# Patient Record
Sex: MALE | Race: OTHER | HISPANIC OR LATINO | Employment: UNEMPLOYED | URBAN - METROPOLITAN AREA
[De-identification: names, ages, dates, MRNs, and addresses within clinical notes are randomized per-mention and may not be internally consistent; named-entity substitution may affect disease eponyms.]

---

## 2017-05-29 ENCOUNTER — HOSPITAL ENCOUNTER (EMERGENCY)
Facility: HOSPITAL | Age: 27
Discharge: HOME | End: 2017-05-29
Admitting: EMERGENCY MEDICINE

## 2017-05-29 ENCOUNTER — APPOINTMENT (EMERGENCY)
Dept: RADIOLOGY | Facility: HOSPITAL | Age: 27
End: 2017-05-29

## 2017-05-29 VITALS
OXYGEN SATURATION: 98 % | RESPIRATION RATE: 16 BRPM | SYSTOLIC BLOOD PRESSURE: 120 MMHG | TEMPERATURE: 98.3 F | DIASTOLIC BLOOD PRESSURE: 76 MMHG | HEART RATE: 79 BPM

## 2017-05-29 DIAGNOSIS — S60.221A CONTUSION OF RIGHT HAND: ICD-10-CM

## 2017-05-29 DIAGNOSIS — S63.91XA: Primary | ICD-10-CM

## 2017-05-29 PROCEDURE — 90471 IMMUNIZATION ADMIN: CPT

## 2017-05-29 PROCEDURE — 99283 EMERGENCY DEPT VISIT LOW MDM: CPT

## 2017-05-29 PROCEDURE — 90715 TDAP VACCINE 7 YRS/> IM: CPT | Performed by: PHYSICIAN ASSISTANT

## 2017-05-29 PROCEDURE — 73130 X-RAY EXAM OF HAND: CPT

## 2017-05-29 RX ORDER — IBUPROFEN 600 MG/1
600 TABLET ORAL ONCE
Status: COMPLETED | OUTPATIENT
Start: 2017-05-29 | End: 2017-05-29

## 2017-05-29 RX ORDER — IBUPROFEN 600 MG/1
600 TABLET ORAL EVERY 6 HOURS PRN
Qty: 30 TABLET | Refills: 0 | Status: SHIPPED | OUTPATIENT
Start: 2017-05-29 | End: 2019-08-12

## 2017-05-29 RX ORDER — BACITRACIN, NEOMYCIN, POLYMYXIN B 400; 3.5; 5 [USP'U]/G; MG/G; [USP'U]/G
1 OINTMENT TOPICAL ONCE
Status: COMPLETED | OUTPATIENT
Start: 2017-05-29 | End: 2017-05-29

## 2017-05-29 RX ORDER — BACITRACIN, NEOMYCIN, POLYMYXIN B 400; 3.5; 5 [USP'U]/G; MG/G; [USP'U]/G
OINTMENT TOPICAL
Status: COMPLETED
Start: 2017-05-29 | End: 2017-05-29

## 2017-05-29 RX ADMIN — TETANUS TOXOID, REDUCED DIPHTHERIA TOXOID AND ACELLULAR PERTUSSIS VACCINE, ADSORBED 0.5 ML: 5; 2.5; 8; 8; 2.5 SUSPENSION INTRAMUSCULAR at 17:57

## 2017-05-29 RX ADMIN — IBUPROFEN 600 MG: 600 TABLET, FILM COATED ORAL at 18:00

## 2017-05-29 RX ADMIN — BACITRACIN, NEOMYCIN, POLYMYXIN B 1 LARGE APPLICATION: 400; 3.5; 5 OINTMENT TOPICAL at 18:00

## 2017-12-14 ENCOUNTER — HOSPITAL ENCOUNTER (EMERGENCY)
Facility: HOSPITAL | Age: 27
Discharge: HOME | End: 2017-12-14

## 2017-12-14 VITALS
RESPIRATION RATE: 16 BRPM | SYSTOLIC BLOOD PRESSURE: 129 MMHG | WEIGHT: 170 LBS | HEART RATE: 86 BPM | DIASTOLIC BLOOD PRESSURE: 66 MMHG | OXYGEN SATURATION: 97 % | TEMPERATURE: 98.6 F

## 2017-12-14 DIAGNOSIS — R30.0 DYSURIA: Primary | ICD-10-CM

## 2017-12-14 DIAGNOSIS — Z11.3 SCREENING EXAMINATION FOR STD (SEXUALLY TRANSMITTED DISEASE): ICD-10-CM

## 2017-12-14 LAB
BILIRUB UR QL STRIP: NEGATIVE
CLARITY UR: NORMAL
COLOR UR: YELLOW
COLOR, POC: NORMAL
GLUCOSE UR STRIP-MCNC: NEGATIVE MG/DL
HGB UR QL STRIP.AUTO: NEGATIVE
KETONES UR STRIP-MCNC: NEGATIVE MG/DL
LEUKOCYTE ESTERASE UR QL STRIP: NEGATIVE
NITRITE UR QL STRIP: NEGATIVE
PH UR STRIP.AUTO: 7 [PH] (ref 4.5–8)
PROT UR STRIP-MCNC: NEGATIVE MG/DL
SP GR UR STRIP.AUTO: 1.02 (ref 1–1.03)
UROBILINOGEN UR QL STRIP.AUTO: 0.2 E.U./DL

## 2017-12-14 PROCEDURE — 87491 CHLMYD TRACH DNA AMP PROBE: CPT | Performed by: PHYSICIAN ASSISTANT

## 2017-12-14 PROCEDURE — 81002 URINALYSIS NONAUTO W/O SCOPE: CPT

## 2017-12-14 PROCEDURE — 87591 N.GONORRHOEAE DNA AMP PROB: CPT | Performed by: PHYSICIAN ASSISTANT

## 2017-12-14 PROCEDURE — 81003 URINALYSIS AUTO W/O SCOPE: CPT

## 2017-12-14 PROCEDURE — 99283 EMERGENCY DEPT VISIT LOW MDM: CPT

## 2017-12-14 PROCEDURE — 96372 THER/PROPH/DIAG INJ SC/IM: CPT

## 2017-12-14 RX ORDER — AZITHROMYCIN 250 MG/1
1000 TABLET, FILM COATED ORAL ONCE
Status: COMPLETED | OUTPATIENT
Start: 2017-12-14 | End: 2017-12-14

## 2017-12-14 RX ADMIN — AZITHROMYCIN 1000 MG: 250 TABLET, FILM COATED ORAL at 19:02

## 2017-12-14 RX ADMIN — LIDOCAINE HYDROCHLORIDE 250 MG: 10 INJECTION, SOLUTION EPIDURAL; INFILTRATION; INTRACAUDAL; PERINEURAL at 19:02

## 2017-12-15 NOTE — DISCHARGE INSTRUCTIONS
Dysuria   WHAT YOU NEED TO KNOW:   Dysuria is difficulty urinating, or pain, burning, or discomfort with urination  Dysuria is usually a symptom of another problem  DISCHARGE INSTRUCTIONS:   Return to the emergency department if:   · You have severe back, side, or abdominal pain  · You have fever and shaking chills  · You vomit several times in a row  Contact your healthcare provider if:   · Your symptoms do not go away, even after treatment  · You have questions or concerns about your condition or care  Medicines:   · Medicines  may be given to help treat a bacterial infection or help decrease bladder spasms  · Take your medicine as directed  Contact your healthcare provider if you think your medicine is not helping or if you have side effects  Tell him of her if you are allergic to any medicine  Keep a list of the medicines, vitamins, and herbs you take  Include the amounts, and when and why you take them  Bring the list or the pill bottles to follow-up visits  Carry your medicine list with you in case of an emergency  Follow up with your healthcare provider as directed: Your healthcare provider may also refer you to a urologist or nephrologist to have additional testing  Write down your questions so you remember to ask them during your visits  Manage your dysuria:   · Drink more liquids  Liquids help flush out bacteria that may be causing an infection  Ask your healthcare provider how much liquid to drink each day and which liquids are best for you  · Take sitz baths as directed  Fill a bathtub with 4 to 6 inches of warm water  You may also use a sitz bath pan that fits over a toilet  Sit in the sitz bath for 20 minutes  Do this 2 to 3 times a day, or as directed  The warm water can help decrease pain and swelling  © 2017 [employer] Information is for End User's use only and may not be sold, redistributed or otherwise used for commercial purposes   All illustrations and images included in CareNotes® are the copyrighted property of A D A M , Inc  or Edwar Carpenter  The above information is an  only  It is not intended as medical advice for individual conditions or treatments  Talk to your doctor, nurse or pharmacist before following any medical regimen to see if it is safe and effective for you  Sexually Transmitted Diseases   WHAT YOU NEED TO KNOW:   A sexually transmitted disease (STD), is also called a sexually transmitted infection (STI)  An STD is an infection caused by bacteria or a virus  STDs are spread by oral, genital, or anal sex  Some examples of STDs are HIV, chlamydia, syphilis, and gonorrhea  DISCHARGE INSTRUCTIONS:   Return to the emergency department if:   · You have genital swelling or pain, or unusual bleeding  · You have joint pain, rash, swollen lymph nodes, or night sweats  · You have severe abdominal pain  Contact your healthcare provider if:   · You have a fever  · Your symptoms do not go away or they get worse, even after treatment  · You have bleeding or pain during sex  · You have questions or concerns about your condition or care  Medicines:   · Medicines  help treat the infection  · Take your medicine as directed  Contact your healthcare provider if you think your medicine is not helping or if you have side effects  Tell him of her if you are allergic to any medicine  Keep a list of the medicines, vitamins, and herbs you take  Include the amounts, and when and why you take them  Bring the list or the pill bottles to follow-up visits  Carry your medicine list with you in case of an emergency  Prevent the spread of an STD:  Ask your healthcare provider for more information about the following safe sex practices:  · Use condoms  Use a latex condom if you have oral, genital, or anal sex  Use a new condom each time  Use a polyurethane condom if you are allergic to latex  · Do not douche    Douching upsets the normal balance of bacteria are found in your vagina  It does not prevent or clear up vaginal infections  · Do not have sex with someone who has an STD  This includes oral and anal sex  · Limit sexual partners  Have sex with one person who is not having sex with anyone else  · Do not have sex during treatment  Do not have sex while you or your partners are being treated for an STD  · Get screening tests regularly  if you are sexually active  · Get vaccinated  Vaccines may help to prevent your risk of some STDs  Ask your healthcare provider for more information about vaccines for STDs  Follow up with your healthcare provider as directed:  Write down your questions so you remember to ask them during your visits  © 2017 [facility] Information is for End User's use only and may not be sold, redistributed or otherwise used for commercial purposes  All illustrations and images included in CareNotes® are the copyrighted property of A D A M , Inc  or Edwar Carpenter  The above information is an  only  It is not intended as medical advice for individual conditions or treatments  Talk to your doctor, nurse or pharmacist before following any medical regimen to see if it is safe and effective for you  DISCHARGE INSTRUCTIONS:    FOLLOW UP WITH YOUR PRIMARY CARE PROVIDER OR THE [address] [place] [address]  MAKE AN APPOINTMENT TO BE SEEN     YOU WERE TESTED FOR GONORRHEA AND CHLAMYDIA TODAY IN THE ER  IF POSITIVE, WE WILL CALL YOU IN 2-3 DAYS  HAVE YOUR PARTNER GET TESTED AND TREATED  NO SEXUAL INTERCOURSE FOR 7 DAYS  REST AND DRINK PLENTY OF FLUIDS  IF SYMPTOMS WORSEN OR NEW SYMPTOMS ARISE, RETURN TO THE ER TO BE SEEN

## 2017-12-15 NOTE — ED PROVIDER NOTES
History  Chief Complaint   Patient presents with    Possible UTI     Burning, irritation with urination, pink discharge after urination  Denies fever  27y  o male with PMH of ADHD and asthma presents to the ER for dysuria for 1 week and hematuria for 2 days  Patient denies taking any medication for his symptoms  He describes his dysuria as burning and symptoms are constant  Patient was last sexually active 3 months ago with one partner and they did not use protection  He denies fever, chills, chest pain, dyspnea, N/V/D, abdominal pain, weakness, paresthesias, urgency, frequency, scrotal/penile swelling or pain or discharge  History provided by:  Patient   used: No        Prior to Admission Medications   Prescriptions Last Dose Informant Patient Reported? Taking?   ibuprofen (MOTRIN) 600 mg tablet   No No   Sig: Take 1 tablet by mouth every 6 (six) hours as needed for mild pain for up to 10 days      Facility-Administered Medications: None       Past Medical History:   Diagnosis Date    ADHD (attention deficit hyperactivity disorder)     Asthma        Past Surgical History:   Procedure Laterality Date    NO PAST SURGERIES         History reviewed  No pertinent family history  I have reviewed and agree with the history as documented  Social History   Substance Use Topics    Smoking status: Current Every Day Smoker     Packs/day: 0 50    Smokeless tobacco: Never Used    Alcohol use Yes      Comment: occasional         Review of Systems   Constitutional: Negative for chills and fever  Respiratory: Negative for shortness of breath  Cardiovascular: Negative for chest pain  Gastrointestinal: Negative for abdominal pain, diarrhea, nausea and vomiting  Genitourinary: Positive for dysuria and hematuria  Negative for discharge, frequency, genital sores, penile pain, penile swelling, scrotal swelling, testicular pain and urgency     Musculoskeletal: Negative for neck stiffness  Skin: Negative for rash  Allergic/Immunologic: Negative for food allergies  Neurological: Negative for weakness and numbness  Physical Exam  ED Triage Vitals [12/14/17 1749]   Temperature Pulse Respirations Blood Pressure SpO2   98 6 °F (37 °C) 86 16 129/66 97 %      Temp Source Heart Rate Source Patient Position - Orthostatic VS BP Location FiO2 (%)   Temporal Monitor Sitting Right arm --      Pain Score       1           Orthostatic Vital Signs  Vitals:    12/14/17 1749   BP: 129/66   Pulse: 86   Patient Position - Orthostatic VS: Sitting       Physical Exam   Constitutional:  Non-toxic appearance  No distress  HENT:   Head: Normocephalic and atraumatic  Right Ear: Tympanic membrane, external ear and ear canal normal  No drainage, swelling or tenderness  No foreign bodies  Tympanic membrane is not erythematous  No hemotympanum  Left Ear: Tympanic membrane, external ear and ear canal normal  No drainage, swelling or tenderness  No foreign bodies  Tympanic membrane is not erythematous  No hemotympanum  Nose: Nose normal    Mouth/Throat: Uvula is midline, oropharynx is clear and moist and mucous membranes are normal  No uvula swelling  No posterior oropharyngeal edema, posterior oropharyngeal erythema or tonsillar abscesses  No tonsillar exudate  Neck: Normal range of motion and phonation normal  Neck supple  No tracheal deviation present  Cardiovascular: Normal rate, regular rhythm, S1 normal, S2 normal and normal heart sounds  Exam reveals no gallop and no friction rub  No murmur heard  Pulmonary/Chest: Effort normal and breath sounds normal  No respiratory distress  He has no decreased breath sounds  He has no wheezes  He has no rhonchi  He has no rales  He exhibits no tenderness  Abdominal: Soft  Bowel sounds are normal  He exhibits no distension  There is no tenderness  There is no rebound, no guarding and no CVA tenderness     Genitourinary: Penis normal  No phimosis, paraphimosis, hypospadias, penile erythema or penile tenderness  No discharge found  Genitourinary Comments: RN present for exam    Neurological: He is alert  GCS eye subscore is 4  GCS verbal subscore is 5  GCS motor subscore is 6  Skin: Skin is warm and dry  No rash noted  Psychiatric: He has a normal mood and affect  Nursing note and vitals reviewed  ED Medications  Medications   azithromycin (ZITHROMAX) tablet 1,000 mg (1,000 mg Oral Given 12/14/17 1902)   cefTRIAXone (ROCEPHIN) 250 mg in lidocaine (PF) (XYLOCAINE-MPF) 1 % IM only syringe (250 mg Intramuscular Given 12/14/17 1902)       Diagnostic Studies  Results Reviewed     Procedure Component Value Units Date/Time    Chlamydia/GC amplified DNA by PCR [[id number]] Collected:  12/14/17 1834    Lab Status:   In process Specimen:  Urine from Urine, Other Updated:  12/14/17 1839    POCT urinalysis dipstick [[id number]]  (Normal) Resulted:  12/14/17 1834    Lab Status:  Final result Updated:  12/14/17 1834     Color, UA -    ED Urine Macroscopic [[id number]]  (Normal) Collected:  12/14/17 1847    Lab Status:  Final result Specimen:  Urine Updated:  12/14/17 1830     Color, UA Yellow     Clarity, UA Cloudy     pH, UA 7 0     Leukocytes, UA Negative     Nitrite, UA Negative     Protein, UA Negative mg/dl      Glucose, UA Negative mg/dl      Ketones, UA Negative mg/dl      Urobilinogen, UA 0 2 E U /dl      Bilirubin, UA Negative     Blood, UA Negative     Specific Gravity, UA 1 025    Narrative:       CLINITEK RESULT                 No orders to display              Procedures  Procedures       Phone Contacts  ED Phone Contact    ED Course  ED Course                                MDM  Number of Diagnoses or Management Options  Dysuria: new and requires workup  Screening examination for STD (sexually transmitted disease): new and requires workup  Diagnosis management comments: DDX consists of but not limited to: UTI, kidney stone, GC/chlamydia, yeast    Will check urine to r/o UTI  Will check GC/chlamydia  Patient is requesting treatment for GC/chlamydia at this time  At discharge, I instructed the patient to:  -follow up with pcp  -informed patient he was tested for GC/chlamydia and if positive, we would call to inform him  -have partner get tested and treated  -no sexual intercourse for 7 days  -have partner get tested and treated  -rest and drink plenty of fluids  -return to the ER if symptoms worsened or new symptoms arose  Patient agreed to this plan and was stable at time of discharge  Amount and/or Complexity of Data Reviewed  Clinical lab tests: ordered and reviewed    Patient Progress  Patient progress: stable    CritCare Time    Disposition  Final diagnoses:   Dysuria   Screening examination for STD (sexually transmitted disease)     Time reflects when diagnosis was documented in both MDM as applicable and the Disposition within this note     Time User Action Codes Description Comment    12/14/2017  7:16 PM Parvin Cassette Add [R30 0] Dysuria     12/14/2017  7:16 PM Parvin Cassette Add [Z11 3] Screening examination for STD (sexually transmitted disease)       ED Disposition     ED Disposition Condition Comment    Discharge  [facility] discharge to home/self care  Condition at discharge: Stable        Follow-up Information     Follow up With Specialties Details Why [address]  Schedule an appointment as soon as possible for a visit in 1 day  [address]  [phone]          Discharge Medication List as of 12/14/2017  7:19 PM      CONTINUE these medications which have NOT CHANGED    Details   ibuprofen (MOTRIN) 600 mg tablet Take 1 tablet by mouth every 6 (six) hours as needed for mild pain for up to 10 days, Starting 5/29/2017, Until Thu 6/8/17, Print           No discharge procedures on file      ED Provider  Electronically Signed by           Sarahi Reynolds PA-C  12/14/17 9778

## 2017-12-19 LAB
CHLAMYDIA DNA CVX QL NAA+PROBE: ABNORMAL
N GONORRHOEA DNA GENITAL QL NAA+PROBE: ABNORMAL

## 2019-08-12 ENCOUNTER — APPOINTMENT (EMERGENCY)
Dept: RADIOLOGY | Facility: HOSPITAL | Age: 29
End: 2019-08-12
Payer: COMMERCIAL

## 2019-08-12 ENCOUNTER — HOSPITAL ENCOUNTER (EMERGENCY)
Facility: HOSPITAL | Age: 29
Discharge: HOME | End: 2019-08-12
Attending: EMERGENCY MEDICINE
Payer: COMMERCIAL

## 2019-08-12 VITALS
RESPIRATION RATE: 18 BRPM | HEART RATE: 72 BPM | OXYGEN SATURATION: 98 % | WEIGHT: 178.35 LBS | TEMPERATURE: 98.3 F | DIASTOLIC BLOOD PRESSURE: 71 MMHG | SYSTOLIC BLOOD PRESSURE: 124 MMHG

## 2019-08-12 DIAGNOSIS — M25.512 ACUTE PAIN OF LEFT SHOULDER: ICD-10-CM
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
## 2019-08-12 PROCEDURE — 73030 X-RAY EXAM OF SHOULDER: CPT

## 2019-08-12 PROCEDURE — 99284 EMERGENCY DEPT VISIT MOD MDM: CPT

## 2019-08-12 PROCEDURE — 99283 EMERGENCY DEPT VISIT LOW MDM: CPT | Performed by: PHYSICIAN ASSISTANT

## 2019-08-12 RX ORDER — NAPROXEN 500 MG/1
500 TABLET ORAL 2 TIMES DAILY WITH MEALS
Qty: 10 TABLET | Refills: 0 | Status: SHIPPED | OUTPATIENT
Start: 2019-08-12 | End: 2019-08-17
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
## 2020-07-10 ENCOUNTER — APPOINTMENT (EMERGENCY)
Dept: CT IMAGING | Facility: HOSPITAL | Age: 30
End: 2020-07-10
Payer: COMMERCIAL

## 2020-07-10 ENCOUNTER — HOSPITAL ENCOUNTER (EMERGENCY)
Facility: HOSPITAL | Age: 30
Discharge: HOME | End: 2020-07-10
Attending: EMERGENCY MEDICINE | Admitting: EMERGENCY MEDICINE
Payer: COMMERCIAL

## 2020-07-10 VITALS
RESPIRATION RATE: 18 BRPM | TEMPERATURE: 98.1 F | OXYGEN SATURATION: 98 % | DIASTOLIC BLOOD PRESSURE: 75 MMHG | WEIGHT: 186.29 LBS | HEART RATE: 76 BPM | SYSTOLIC BLOOD PRESSURE: 120 MMHG

## 2020-07-10 DIAGNOSIS — R10.9 ABDOMINAL PAIN: Primary | ICD-10-CM

## 2020-07-10 DIAGNOSIS — K43.9 VENTRAL HERNIA WITHOUT OBSTRUCTION OR GANGRENE: ICD-10-CM

## 2020-07-10 DIAGNOSIS — K40.90 LEFT INGUINAL HERNIA: ICD-10-CM

## 2020-07-10 LAB
ALBUMIN SERPL BCP-MCNC: 4 G/DL (ref 3.5–5)
ALP SERPL-CCNC: 79 U/L (ref 46–116)
ALT SERPL W P-5'-P-CCNC: 20 U/L (ref 12–78)
ANION GAP SERPL CALCULATED.3IONS-SCNC: 9 MMOL/L (ref 4–13)
AST SERPL W P-5'-P-CCNC: 16 U/L (ref 5–45)
BACTERIA UR QL AUTO: ABNORMAL /HPF
BASOPHILS # BLD AUTO: 0.01 THOUSANDS/ΜL (ref 0–0.1)
BASOPHILS NFR BLD AUTO: 0 % (ref 0–1)
BILIRUB SERPL-MCNC: 0.74 MG/DL (ref 0.2–1)
BILIRUB UR QL STRIP: NEGATIVE
BUN SERPL-MCNC: 9 MG/DL (ref 5–25)
CALCIUM SERPL-MCNC: 8.7 MG/DL (ref 8.3–10.1)
CHLORIDE SERPL-SCNC: 102 MMOL/L (ref 100–108)
CLARITY UR: CLEAR
CO2 SERPL-SCNC: 26 MMOL/L (ref 21–32)
COLOR UR: YELLOW
COLOR, POC: NORMAL
CREAT SERPL-MCNC: 0.95 MG/DL (ref 0.6–1.3)
EOSINOPHIL # BLD AUTO: 0.04 THOUSAND/ΜL (ref 0–0.61)
EOSINOPHIL NFR BLD AUTO: 1 % (ref 0–6)
ERYTHROCYTE [DISTWIDTH] IN BLOOD BY AUTOMATED COUNT: 13.4 % (ref 11.6–15.1)
GFR SERPL CREATININE-BSD FRML MDRD: 108 ML/MIN/1.73SQ M
GLUCOSE SERPL-MCNC: 101 MG/DL (ref 65–140)
GLUCOSE UR STRIP-MCNC: NEGATIVE MG/DL
HCT VFR BLD AUTO: 48.5 % (ref 36.5–49.3)
HGB BLD-MCNC: 16.6 G/DL (ref 12–17)
HGB UR QL STRIP.AUTO: ABNORMAL
IMM GRANULOCYTES # BLD AUTO: 0.02 THOUSAND/UL (ref 0–0.2)
IMM GRANULOCYTES NFR BLD AUTO: 0 % (ref 0–2)
KETONES UR STRIP-MCNC: NEGATIVE MG/DL
LEUKOCYTE ESTERASE UR QL STRIP: NEGATIVE
LIPASE SERPL-CCNC: 65 U/L (ref 73–393)
LYMPHOCYTES # BLD AUTO: 1.82 THOUSANDS/ΜL (ref 0.6–4.47)
LYMPHOCYTES NFR BLD AUTO: 28 % (ref 14–44)
MCH RBC QN AUTO: 28.8 PG (ref 26.8–34.3)
MCHC RBC AUTO-ENTMCNC: 34.2 G/DL (ref 31.4–37.4)
MCV RBC AUTO: 84 FL (ref 82–98)
MONOCYTES # BLD AUTO: 0.64 THOUSAND/ΜL (ref 0.17–1.22)
MONOCYTES NFR BLD AUTO: 10 % (ref 4–12)
NEUTROPHILS # BLD AUTO: 3.88 THOUSANDS/ΜL (ref 1.85–7.62)
NEUTS SEG NFR BLD AUTO: 61 % (ref 43–75)
NITRITE UR QL STRIP: NEGATIVE
NON-SQ EPI CELLS URNS QL MICRO: ABNORMAL /HPF
NRBC BLD AUTO-RTO: 0 /100 WBCS
PH UR STRIP.AUTO: 7.5 [PH] (ref 4.5–8)
PLATELET # BLD AUTO: 193 THOUSANDS/UL (ref 149–390)
PMV BLD AUTO: 9.9 FL (ref 8.9–12.7)
POTASSIUM SERPL-SCNC: 3.6 MMOL/L (ref 3.5–5.3)
PROT SERPL-MCNC: 7.8 G/DL (ref 6.4–8.2)
PROT UR STRIP-MCNC: NEGATIVE MG/DL
RBC # BLD AUTO: 5.76 MILLION/UL (ref 3.88–5.62)
RBC #/AREA URNS AUTO: ABNORMAL /HPF
SODIUM SERPL-SCNC: 137 MMOL/L (ref 136–145)
SP GR UR STRIP.AUTO: 1.01 (ref 1–1.03)
UROBILINOGEN UR QL STRIP.AUTO: 0.2 E.U./DL
WBC # BLD AUTO: 6.41 THOUSAND/UL (ref 4.31–10.16)
WBC #/AREA URNS AUTO: ABNORMAL /HPF

## 2020-07-10 PROCEDURE — 83690 ASSAY OF LIPASE: CPT | Performed by: PHYSICIAN ASSISTANT

## 2020-07-10 PROCEDURE — 96361 HYDRATE IV INFUSION ADD-ON: CPT

## 2020-07-10 PROCEDURE — 96374 THER/PROPH/DIAG INJ IV PUSH: CPT

## 2020-07-10 PROCEDURE — 36415 COLL VENOUS BLD VENIPUNCTURE: CPT | Performed by: PHYSICIAN ASSISTANT

## 2020-07-10 PROCEDURE — 96375 TX/PRO/DX INJ NEW DRUG ADDON: CPT

## 2020-07-10 PROCEDURE — 80053 COMPREHEN METABOLIC PANEL: CPT | Performed by: PHYSICIAN ASSISTANT

## 2020-07-10 PROCEDURE — 99284 EMERGENCY DEPT VISIT MOD MDM: CPT

## 2020-07-10 PROCEDURE — 85025 COMPLETE CBC W/AUTO DIFF WBC: CPT | Performed by: PHYSICIAN ASSISTANT

## 2020-07-10 PROCEDURE — 99285 EMERGENCY DEPT VISIT HI MDM: CPT | Performed by: EMERGENCY MEDICINE

## 2020-07-10 PROCEDURE — 81001 URINALYSIS AUTO W/SCOPE: CPT

## 2020-07-10 PROCEDURE — NC001 PR NO CHARGE: Performed by: SURGERY

## 2020-07-10 PROCEDURE — 74177 CT ABD & PELVIS W/CONTRAST: CPT

## 2020-07-10 RX ORDER — KETOROLAC TROMETHAMINE 30 MG/ML
15 INJECTION, SOLUTION INTRAMUSCULAR; INTRAVENOUS ONCE
Status: COMPLETED | OUTPATIENT
Start: 2020-07-10 | End: 2020-07-10

## 2020-07-10 RX ORDER — HYDROMORPHONE HCL/PF 1 MG/ML
0.5 SYRINGE (ML) INJECTION ONCE
Status: COMPLETED | OUTPATIENT
Start: 2020-07-10 | End: 2020-07-10

## 2020-07-10 RX ORDER — OXYCODONE HYDROCHLORIDE AND ACETAMINOPHEN 5; 325 MG/1; MG/1
1 TABLET ORAL EVERY 8 HOURS PRN
Qty: 6 TABLET | Refills: 0 | Status: SHIPPED | OUTPATIENT
Start: 2020-07-10

## 2020-07-10 RX ADMIN — KETOROLAC TROMETHAMINE 15 MG: 30 INJECTION, SOLUTION INTRAMUSCULAR at 12:43

## 2020-07-10 RX ADMIN — HYDROMORPHONE HYDROCHLORIDE 0.5 MG: 1 INJECTION, SOLUTION INTRAMUSCULAR; INTRAVENOUS; SUBCUTANEOUS at 16:27

## 2020-07-10 RX ADMIN — SODIUM CHLORIDE 1000 ML: 0.9 INJECTION, SOLUTION INTRAVENOUS at 12:43

## 2020-07-10 RX ADMIN — IOHEXOL 100 ML: 350 INJECTION, SOLUTION INTRAVENOUS at 14:16

## 2020-07-10 NOTE — CONSULTS
Consultation -   [address] 31 y o  male MRN: [id number]  Unit/Bed#: ED 24 Encounter: [id number]    Assessment/Plan     Assessment:  33yo M with symptomatic left inguinal hernia without signs of incarceration or strangulation    Plan:  · Outpatient follow-up for elective [facility]  · Pain control  · Discharge home from ED    History of Present Illness     HPI:  Robbin Avendaño is a 31 y o  male with PMHx of asthma, ADHD, who presents with left inguinal hernia  He states the hernia has been present for some time, but largely asymptomatic and so he has not sought treatment  Last night, he experienced severe pain in the LLQ radiating towards his L flank/back  Pain is improved with lying flat  He has had similar episodes of pain in the past, but never this severe  No other associated symptoms  CT A/P showed large fat-containing left inguinal hernia with trace pelvic fluid, likely reactive 2/2 inguinal hernia  There was additionally a small fat-containing supraumbilical hernia  Consults    Review of Systems   All other systems reviewed and are negative  (except as stated in HPI)    Historical Information   Past Medical History:   Diagnosis Date    ADHD (attention deficit hyperactivity disorder)     Asthma      Past Surgical History:   Procedure Laterality Date    NO PAST SURGERIES       Social History   Social History     Substance and Sexual Activity   Alcohol Use Yes    Comment: occasional      Social History     Substance and Sexual Activity   Drug Use Yes    Types: Marijuana     E-Cigarette/Vaping    E-Cigarette Use Never User      E-Cigarette/Vaping Substances     Social History     Tobacco Use   Smoking Status Former Smoker    Packs/day: 0 50   Smokeless Tobacco Never Used     Family History: History reviewed  No pertinent family history      Meds/Allergies   all current active meds have been reviewed and current meds:   Current Facility-Administered Medications   Medication Dose Route Frequency    HYDROmorphone (DILAUDID) injection 0 5 mg  0 5 mg Intravenous Once     No Known Allergies    Objective   First Vitals:   Blood Pressure: 126/72 (07/10/20 1220)  Pulse: 98 (07/10/20 1220)  Temperature: 98 1 °F (36 7 °C) (07/10/20 1220)  Temp Source: Temporal (07/10/20 1220)  Respirations: 14 (07/10/20 1220)  Weight - Scale: 84 5 kg (186 lb 4 6 oz) (07/10/20 1220)  SpO2: 96 % (07/10/20 1220)    Current Vitals:   Blood Pressure: 125/83 (07/10/20 1509)  Pulse: 74 (07/10/20 1509)  Temperature: 98 1 °F (36 7 °C) (07/10/20 1220)  Temp Source: Temporal (07/10/20 1220)  Respirations: 18 (07/10/20 1509)  Weight - Scale: 84 5 kg (186 lb 4 6 oz) (07/10/20 1220)  SpO2: 99 % (07/10/20 1509)      Intake/Output Summary (Last 24 hours) at 7/10/2020 1556  Last data filed at 7/10/2020 1409  Gross per 24 hour   Intake 1000 ml   Output --   Net 1000 ml       Invasive Devices     Peripheral Intravenous Line            Peripheral IV 07/10/20 Right Forearm less than 1 day                Physical Exam   Constitutional: He is oriented to person, place, and time  He appears well-developed and well-nourished  No distress  HENT:   Head: Normocephalic and atraumatic  Eyes: EOM are normal    Neck: Normal range of motion  Cardiovascular: Normal rate, regular rhythm and intact distal pulses  Pulmonary/Chest: Effort normal  No respiratory distress  Abdominal: Soft  He exhibits no distension and no mass  There is tenderness in the left upper quadrant  There is no rigidity, no rebound and no guarding  A hernia is present  Hernia confirmed positive in the left inguinal area (soft, reducible, very tender)  Small supraumbilical hernia, soft, easily reducible   Genitourinary: Left testis shows swelling and tenderness  Musculoskeletal: Normal range of motion  He exhibits no edema or deformity  Neurological: He is alert and oriented to person, place, and time  Skin: Skin is warm and dry  No rash noted  He is not diaphoretic  No erythema  No pallor  Psychiatric: He has a normal mood and affect  Vitals reviewed  Lab Results:   CBC:   Lab Results   Component Value Date    WBC 6 41 07/10/2020    HGB 16 6 07/10/2020    HCT 48 5 07/10/2020    MCV 84 07/10/2020     07/10/2020    MCH 28 8 07/10/2020    MCHC 34 2 07/10/2020    RDW 13 4 07/10/2020    MPV 9 9 07/10/2020    NRBC 0 07/10/2020   , CMP:   Lab Results   Component Value Date    SODIUM 137 07/10/2020    K 3 6 07/10/2020     07/10/2020    CO2 26 07/10/2020    BUN 9 07/10/2020    CREATININE 0 95 07/10/2020    CALCIUM 8 7 07/10/2020    AST 16 07/10/2020    ALT 20 07/10/2020    ALKPHOS 79 07/10/2020    EGFR 108 07/10/2020   , Lipase:   Lab Results   Component Value Date    LIPASE 65 (L) 07/10/2020     Imaging: I have personally reviewed pertinent reports  CT abdomen pelvis with contrast   Final Result by Antonella Jackson MD (07/10 1474)      Large fat-containing left inguinal hernia extending to the level of the scrotum  Recommend correlation with physical exam to evaluate for incarceration  Small fat-containing supraumbilical ventral hernia  Small volume of pelvic fluid, atypical for a male patient and could be related to an acute inflammatory process (possibly left inguinal hernia)  EKG, Pathology, and Other Studies: I have personally reviewed pertinent reports

## 2020-07-10 NOTE — ED NOTES
Pt unable to void at this time  Pt will ring call bell when able to void        Lawerence Melissa  63/51/49 [id number]

## 2020-07-10 NOTE — ED PROVIDER NOTES
History  Chief Complaint   Patient presents with    Abdominal Pain     pt c/o luq pain that radiates to back pain started last night  denies n/v/d no fevers  also c/o sore throat      Patient is a 42-year-old male with history of ADHD and asthma, presents emergency department for evaluation of left lower quadrant pain  Patient states he began with left lower quadrant pain with radiation of pain to right flank last night  Patient states pain is constant, sharp episodes of pain wax and wane  Patient states he was in tears from the pain last night  Patient rates his pain 7/10 and has nausea  Patient states he has had similar pain in the past, has never followed up with a specialist regarding this pain  Patient states he has decreased appetite  Patient denies fever, chills, dysuria, hematuria, testicular pain/swelling, vomiting, diarrhea, constipation, melena, hematochezia, hematemesis  Prior to Admission Medications   Prescriptions Last Dose Informant Patient Reported? Taking?   naproxen (NAPROSYN) 500 mg tablet   No No   Sig: Take 1 tablet (500 mg total) by mouth 2 (two) times a day with meals for 5 days      Facility-Administered Medications: None       Past Medical History:   Diagnosis Date    ADHD (attention deficit hyperactivity disorder)     Asthma        Past Surgical History:   Procedure Laterality Date    NO PAST SURGERIES         History reviewed  No pertinent family history  I have reviewed and agree with the history as documented  E-Cigarette/Vaping    E-Cigarette Use Never User      E-Cigarette/Vaping Substances     Social History     Tobacco Use    Smoking status: Former Smoker     Packs/day: 0 50    Smokeless tobacco: Never Used   Substance Use Topics    Alcohol use: Yes     Comment: occasional     Drug use: Yes     Types: Marijuana       Review of Systems   Constitutional: Positive for appetite change  Negative for chills and fever     HENT: Negative for ear pain and sore throat  Eyes: Negative for redness  Respiratory: Negative for chest tightness and shortness of breath  Cardiovascular: Negative for chest pain  Gastrointestinal: Positive for abdominal pain and nausea  Negative for abdominal distention, constipation, diarrhea and vomiting  Genitourinary: Positive for flank pain  Negative for dysuria, penile swelling, scrotal swelling and testicular pain  Musculoskeletal: Negative for back pain and neck pain  Skin: Negative for rash  Neurological: Negative for speech difficulty and weakness  Psychiatric/Behavioral: Negative for confusion  Physical Exam  Physical Exam   Constitutional: He is oriented to person, place, and time  He appears well-developed and well-nourished  HENT:   Head: Normocephalic and atraumatic  Nose: Nose normal    Neck: Normal range of motion  Cardiovascular: Normal rate and regular rhythm  Pulmonary/Chest: Effort normal and breath sounds normal    Abdominal: Soft  Normal appearance  Bowel sounds are decreased  There is tenderness in the left lower quadrant  There is CVA tenderness (left)  There is no rigidity, no rebound and no guarding  A hernia is present  Hernia confirmed positive in the left inguinal area  Genitourinary:   Genitourinary Comments: ED tech present during exam   Musculoskeletal: Normal range of motion  Neurological: He is alert and oriented to person, place, and time  Skin: Skin is warm and dry  Psychiatric: He has a normal mood and affect   His behavior is normal        Vital Signs  ED Triage Vitals [07/10/20 1220]   Temperature Pulse Respirations Blood Pressure SpO2   98 1 °F (36 7 °C) 98 14 126/72 96 %      Temp Source Heart Rate Source Patient Position - Orthostatic VS BP Location FiO2 (%)   Temporal Monitor Sitting Right arm --      Pain Score       6           Vitals:    07/10/20 1220 07/10/20 1509 07/10/20 1704   BP: 126/72 125/83 120/75   Pulse: 98 74 76   Patient Position - Orthostatic VS: Sitting Lying Lying         Visual Acuity      ED Medications  Medications   sodium chloride 0 9 % bolus 1,000 mL (0 mL Intravenous Stopped 7/10/20 1409)   ketorolac (TORADOL) injection 15 mg (15 mg Intravenous Given 7/10/20 1243)   iohexol (OMNIPAQUE) 350 MG/ML injection (SINGLE-DOSE) 100 mL (100 mL Intravenous Given 7/10/20 1416)   HYDROmorphone (DILAUDID) injection 0 5 mg (0 5 mg Intravenous Given 7/10/20 1627)       Diagnostic Studies  Results Reviewed     Procedure Component Value Units Date/Time    Urine Microscopic [[id number]]  (Abnormal) Collected:  07/10/20 1510    Lab Status:  Final result Specimen:  Urine, Clean Catch Updated:  07/10/20 1555     RBC, UA 0-1 /hpf      WBC, UA 0-1 /hpf      Epithelial Cells Occasional /hpf      Bacteria, UA Occasional /hpf     POCT urinalysis dipstick [[id number]]  (Normal) Resulted:  07/10/20 1515    Lab Status:  Final result Specimen:  Urine Updated:  07/10/20 1515     Color, UA yellow, clear    Urine Macroscopic, POC [[id number]]  (Abnormal) Collected:  07/10/20 1510    Lab Status:  Final result Specimen:  Urine Updated:  07/10/20 1511     Color, UA Yellow     Clarity, UA Clear     pH, UA 7 5     Leukocytes, UA Negative     Nitrite, UA Negative     Protein, UA Negative mg/dl      Glucose, UA Negative mg/dl      Ketones, UA Negative mg/dl      Urobilinogen, UA 0 2 E U /dl      Bilirubin, UA Negative     Blood, UA Trace     Specific [place], UA 1 015    Narrative:       CL[facility] RESULT    Comprehensive metabolic panel [[id number]] Collected:  07/10/20 1243    Lab Status:  Final result Specimen:  Blood from Arm, Left Updated:  07/10/20 1322     Sodium 137 mmol/L      Potassium 3 6 mmol/L      Chloride 102 mmol/L      CO2 26 mmol/L      ANION GAP 9 mmol/L      BUN 9 mg/dL      Creatinine 0 95 mg/dL      Glucose 101 mg/dL      Calcium 8 7 mg/dL      AST 16 U/L      ALT 20 U/L      Alkaline Phosphatase 79 U/L      Total Protein 7 8 g/dL      Albumin 4 0 g/dL      Total Bilirubin 0 74 mg/dL      eGFR 108 ml/min/1 73sq m     Narrative:       [place] guidelines for Chronic Kidney Disease (CKD):     Stage 1 with normal or high GFR (GFR > 90 mL/min/1 73 square meters)    Stage 2 Mild CKD (GFR = 60-89 mL/min/1 73 square meters)    Stage 3A Moderate CKD (GFR = 45-59 mL/min/1 73 square meters)    Stage 3B Moderate CKD (GFR = 30-44 mL/min/1 73 square meters)    Stage 4 Severe CKD (GFR = 15-29 mL/min/1 73 square meters)    Stage 5 End Stage CKD (GFR <15 mL/min/1 73 square meters)  Note: GFR calculation is accurate only with a steady state creatinine    Lipase [[id number]]  (Abnormal) Collected:  07/10/20 1243    Lab Status:  Final result Specimen:  Blood from Arm, Left Updated:  07/10/20 1322     Lipase 65 u/L     CBC and differential [[id number]]  (Abnormal) Collected:  07/10/20 1243    Lab Status:  Final result Specimen:  Blood from Arm, Left Updated:  07/10/20 1255     WBC 6 41 Thousand/uL      RBC 5 76 Million/uL      Hemoglobin 16 6 g/dL      Hematocrit 48 5 %      MCV 84 fL      MCH 28 8 pg      MCHC 34 2 g/dL      RDW 13 4 %      MPV 9 9 fL      Platelets 127 Thousands/uL      nRBC 0 /100 WBCs      Neutrophils Relative 61 %      Immat GRANS % 0 %      Lymphocytes Relative 28 %      Monocytes Relative 10 %      Eosinophils Relative 1 %      Basophils Relative 0 %      Neutrophils Absolute 3 88 Thousands/µL      Immature Grans Absolute 0 02 Thousand/uL      Lymphocytes Absolute 1 82 Thousands/µL      Monocytes Absolute 0 64 Thousand/µL      Eosinophils Absolute 0 04 Thousand/µL      Basophils Absolute 0 01 Thousands/µL                  CT abdomen pelvis with contrast   Final Result by Martín Gray MD (07/10 9549)      Large fat-containing left inguinal hernia extending to the level of the scrotum  Recommend correlation with physical exam to evaluate for incarceration  Small fat-containing supraumbilical ventral hernia        Small volume of pelvic fluid, atypical for a male patient and could be related to an acute inflammatory process (possibly left inguinal hernia)  I personally discussed this study with Edvin Allison on 7/10/2020 at 2:39 PM                      Workstation performed: [id number]                    Procedures  Procedures         ED Course  ED Course as of Jul 10 1724   Fri Jul 10, 2020   1451 Large fat-containing left inguinal hernia extending to the level of the scrotum  Recommend correlation with physical exam to evaluate for incarceration      Small fat-containing supraumbilical ventral hernia      Small volume of pelvic fluid, atypical for a male patient and could be related to an acute inflammatory process (possibly left inguinal hernia)       CT abdomen pelvis with contrast   1459 Attempted to reduce inguinal hernia, unable to reduce  Will give pain medication and tiger text surgery  R Kaylie [address] Surgery resident evaluating patient now      [address] Surgery resident, Dr Terrance Chiu, states hernia is not incarcerated or strangulated, gave patient information for Dr Nahomy Armstrong clinic to have him follow-up as outpatient early next week  US AUDIT      Most Recent Value   Initial Alcohol Screen: US AUDIT-C    1  How often do you have a drink containing alcohol?  0 Filed at: 07/10/2020 1221   2  How many drinks containing alcohol do you have on a typical day you are drinking? 0 Filed at: 07/10/2020 1221   3a  Male UNDER 65: How often do you have five or more drinks on one occasion? 0 Filed at: 07/10/2020 1221   3b  FEMALE Any Age, or MALE 65+: How often do you have 4 or more drinks on one occassion? 0 Filed at: 07/10/2020 1221   Audit-C Score  0 Filed at: 07/10/2020 1221                  ANGELO/DAST-10      Most Recent Value   How many times in the past year have you    Used an illegal drug or used a prescription medication for non-medical reasons?   Never Filed at: 07/10/2020 1221                                MDM  Number of Diagnoses or Management Options  Abdominal pain: new and requires workup  Left inguinal hernia: new and requires workup  Ventral hernia without obstruction or gangrene: new and requires workup  Diagnosis management comments: Patient is a 72-year-old male with history of ADHD and asthma, presents emergency department for evaluation of left lower quadrant pain  Patient states he began with left lower quadrant pain with radiation of pain to right flank last night  Patient states pain is constant, sharp episodes of pain wax and wane  Patient states he was in tears from the pain last night  Patient rates his pain 7/10 and has nausea  Patient states he has had similar pain in the past, has never followed up with a specialist regarding this pain  Lab work and UA unremarkable  CT a/p shows: Large fat-containing left inguinal hernia extending to the level of the scrotum  Recommend correlation with physical exam to evaluate for incarceration  Small fat-containing supraumbilical ventral hernia  Small volume of pelvic fluid, atypical for a male patient and could be related to an acute inflammatory process (possibly left inguinal hernia)  Attempted to reduce hernia in ED, unable to reduce myself  Discussed case with surgery who saw and evaluated patient in ED  Surgery resident, Dr Chel Elizabeth, states hernia is not incarcerated or strangulated, gave patient information for Dr Ralph Velasquez clinic to have him follow-up as outpatient early next week  Rx for small quantity of percocet provided to patient as needed for pain, I reviewed this patient through the [facility] portal and did not find any evidence of narcotic abuse or doctor shopping  Information for general surgery provided patient in stressed importance of calling next week for an appointment to schedule outpatient surgery for hernia repair  Strict return precautions provided  Patient verbalizes understanding and agrees with plan   The management plan was discussed in detail with the patient at bedside and all questions were answered  Prior to discharge, I provided both verbal and written instructions  I discussed with the patient the signs and symptoms for which to return to the emergency department  All questions were answered and patient was comfortable with the plan of care and discharged to home  The patient agrees to return to the Emergency Department for concerns and/or progression of illness  Disposition  Final diagnoses:   Abdominal pain   Left inguinal hernia   Ventral hernia without obstruction or gangrene     Time reflects when diagnosis was documented in both MDM as applicable and the Disposition within this note     Time User Action Codes Description Comment    7/10/2020  4:37 PM Ed Erwin Add [R10 9] Abdominal pain     7/10/2020  4:38 PM Ed Erwin Add [K40 90] Left inguinal hernia     7/10/2020  4:38 PM Ed Erwin Add [K43 9] Ventral hernia without obstruction or gangrene       ED Disposition     ED Disposition Condition Date/Time Comment    Discharge Stable Fri Jul 10, 2020  4:39 PM Jonel Marin discharge to home/self care  Follow-up Information     Follow up With Specialties Details Why Contact Info Additional Information    Yolie Salinas MD General Surgery, Wound Care Call in 1 week(s) Please call the office to schedule an appointment to discuss repair of your hernia    [address] [place] [address]  [address]  [address]       [address] Emergency Department Emergency Medicine Go to  If symptoms worsen [facility] [id number] [phone] [facility], [address], [place], [address]          Discharge Medication List as of 7/10/2020  5:05 PM      START taking these medications    Details   oxyCODONE-acetaminophen (PERCOCET) 5-325 mg per tablet Take 1 tablet by mouth every 8 (eight) hours as needed for severe painMax Daily Amount: 3 tablets, Starting Fri 7/10/2020, Normal         CONTINUE these medications which have NOT CHANGED    Details   naproxen (NAPROSYN) 500 mg tablet Take 1 tablet (500 mg total) by mouth 2 (two) times a day with meals for 5 days, Starting Mon 8/12/2019, Until Sat 8/17/2019, Print           No discharge procedures on file      PDMP Review     None          ED Provider  Electronically Signed by           Ce Rose PA-C  07/10/20 [facility]  07/10/20 5159

## 2021-01-21 ENCOUNTER — HOSPITAL ENCOUNTER (EMERGENCY)
Facility: HOSPITAL | Age: 31
Discharge: HOME | End: 2021-01-21
Attending: EMERGENCY MEDICINE | Admitting: EMERGENCY MEDICINE
Payer: COMMERCIAL

## 2021-01-21 VITALS
TEMPERATURE: 97.5 F | HEART RATE: 82 BPM | OXYGEN SATURATION: 99 % | DIASTOLIC BLOOD PRESSURE: 61 MMHG | SYSTOLIC BLOOD PRESSURE: 131 MMHG | WEIGHT: 179.9 LBS | RESPIRATION RATE: 19 BRPM

## 2021-01-21 DIAGNOSIS — J02.9 PHARYNGITIS: Primary | ICD-10-CM

## 2021-01-21 LAB — S PYO DNA THROAT QL NAA+PROBE: NORMAL

## 2021-01-21 PROCEDURE — 87651 STREP A DNA AMP PROBE: CPT | Performed by: PHYSICIAN ASSISTANT

## 2021-01-21 PROCEDURE — 99284 EMERGENCY DEPT VISIT MOD MDM: CPT | Performed by: PHYSICIAN ASSISTANT

## 2021-01-21 PROCEDURE — 99283 EMERGENCY DEPT VISIT LOW MDM: CPT

## 2021-01-21 RX ADMIN — DEXAMETHASONE SODIUM PHOSPHATE 10 MG: 10 INJECTION, SOLUTION INTRAMUSCULAR; INTRAVENOUS at 16:00

## 2021-01-21 NOTE — ED PROVIDER NOTES
History  Chief Complaint   Patient presents with    Medical Problem     Pt reports feeling like he had a build-up of mucous in his throat  When he finally brought up the mucous he noticed bright red blood with it  Pt denies fevers, cough, headache  Reports h/o acute bronchitis in childhood  Patient is a 32 y/o male with history of asthma, bronchitis, ADHD, presents emergency department for evaluation of sore throat  Patient states he has been having filled mucus in his throat, has been forcing clearing his throat, causing him to notice bright red blood in his phlegm once today  Patient unsure of how long he has been having sore throat  Patient states he has had this in the past, states he told it is pharyngitis  Patient has not taken any medication for his symptoms, has been drinking tea without relief  Patient denies fever, difficulty swallowing, difficulty breathing, drooling, cough, chest pain, hemoptysis,          Prior to Admission Medications   Prescriptions Last Dose Informant Patient Reported? Taking?   naproxen (NAPROSYN) 500 mg tablet   No No   Sig: Take 1 tablet (500 mg total) by mouth 2 (two) times a day with meals for 5 days   oxyCODONE-acetaminophen (PERCOCET) 5-325 mg per tablet   No No   Sig: Take 1 tablet by mouth every 8 (eight) hours as needed for severe painMax Daily Amount: 3 tablets      Facility-Administered Medications: None       Past Medical History:   Diagnosis Date    ADHD (attention deficit hyperactivity disorder)     Asthma        Past Surgical History:   Procedure Laterality Date    NO PAST SURGERIES         History reviewed  No pertinent family history  I have reviewed and agree with the history as documented      E-Cigarette/Vaping    E-Cigarette Use Never User      E-Cigarette/Vaping Substances    Nicotine No     THC No     CBD No     Flavoring No     Other No     Unknown No      Social History     Tobacco Use    Smoking status: Former Smoker     Packs/day: 0 50  Smokeless tobacco: Never Used   Substance Use Topics    Alcohol use: Yes     Comment: occasional     Drug use: Yes     Types: Marijuana     Comment: last use: 1/21/21       Review of Systems   Constitutional: Negative for chills and fever  HENT: Positive for sore throat  Negative for congestion, drooling, ear discharge, ear pain, sinus pressure, sinus pain and trouble swallowing  Eyes: Negative for redness  Respiratory: Negative for cough and shortness of breath  Cardiovascular: Negative for chest pain  Gastrointestinal: Negative for abdominal pain, diarrhea and vomiting  Musculoskeletal: Negative for back pain and neck pain  Skin: Negative for rash  Neurological: Negative for speech difficulty and weakness  Psychiatric/Behavioral: Negative for confusion  Physical Exam  Physical Exam  Constitutional:       Appearance: He is well-developed  HENT:      Head: Normocephalic and atraumatic  Nose: Nose normal       Mouth/Throat:      Lips: Pink  Mouth: Mucous membranes are moist       Pharynx: Uvula midline  Oropharyngeal exudate and posterior oropharyngeal erythema present  No uvula swelling  Tonsils: No tonsillar exudate or tonsillar abscesses  1+ on the right  1+ on the left  Comments: No drooling or difficulty swallowing  Neck:      Musculoskeletal: Normal range of motion  Cardiovascular:      Rate and Rhythm: Normal rate and regular rhythm  Pulmonary:      Effort: Pulmonary effort is normal       Breath sounds: Normal breath sounds  Musculoskeletal: Normal range of motion  Skin:     General: Skin is warm and dry  Neurological:      Mental Status: He is alert and oriented to person, place, and time     Psychiatric:         Behavior: Behavior normal          Vital Signs  ED Triage Vitals [01/21/21 1449]   Temperature Pulse Respirations Blood Pressure SpO2   97 5 °F (36 4 °C) 82 19 131/61 99 %      Temp src Heart Rate Source Patient Position - Orthostatic VS BP Location FiO2 (%)   -- Monitor Sitting Right arm --      Pain Score       --           Vitals:    01/21/21 1449   BP: 131/61   Pulse: 82   Patient Position - Orthostatic VS: Sitting         Visual Acuity      ED Medications  Medications   dexamethasone oral liquid 10 mg 1 mL (10 mg Oral Given 1/21/21 1600)       Diagnostic Studies  Results Reviewed     Procedure Component Value Units Date/Time    Strep A PCR [[id number]]  (Normal) Collected: 01/21/21 1559    Lab Status: Final result Specimen: Throat Updated: 01/21/21 1639     STREP A PCR None Detected                 No orders to display              Procedures  Procedures         ED Course                                           MDM  Number of Diagnoses or Management Options  Pharyngitis: new and does not require workup  Diagnosis management comments: Patient is a 30 y/o male with history of asthma, bronchitis, ADHD, presents emergency department for evaluation of sore throat  Patient states he has been having filled mucus in his throat, has been forcing clearing his throat, causing him to notice bright red blood in his phlegm once today  Strep A PCR negative  Decadron given in ED for inflammation  Advised patient to stop forcefully clearing his throat as he is causing bleeding to occur  Information for ENT provided and encouraged to f/u  Patient verbalizes understanding and agrees with plan  The management plan was discussed in detail with the patient at bedside and all questions were answered  Prior to discharge, I provided both verbal and written instructions  I discussed with the patient the signs and symptoms for which to return to the emergency department  All questions were answered and patient was comfortable with the plan of care and discharged to home  The patient agrees to return to the Emergency Department for concerns and/or progression of illness        Disposition  Final diagnoses:   Pharyngitis     Time reflects when diagnosis was documented in both MDM as applicable and the Disposition within this note     Time User Action Codes Description Comment    1/21/2021  4:40 PM Chiquita Santo Add [J02 9] Pharyngitis       ED Disposition     ED Disposition Condition Date/Time Comment    Discharge Stable Thu Jan 21, 2021  4:40 PM Steffi Roman discharge to home/self care  Follow-up Information     Follow up With Specialties Details Why Contact Info Additional [address] [facility] ENT Otolaryngology Schedule an appointment as soon as possible for a visit   [address] [phone]  [place] [id number] [facility], [address] [place] [address], [id number] [phone]          Patient's Medications   Discharge Prescriptions    No medications on file     No discharge procedures on file      PDMP Review     None          ED Provider  Electronically Signed by           Zayra Guillermo PA-C  01/21/21 7688

## 2021-02-04 ENCOUNTER — HOSPITAL ENCOUNTER (EMERGENCY)
Facility: HOSPITAL | Age: 31
Discharge: HOME | End: 2021-02-05
Attending: EMERGENCY MEDICINE
Payer: COMMERCIAL

## 2021-02-04 VITALS
TEMPERATURE: 98 F | RESPIRATION RATE: 18 BRPM | DIASTOLIC BLOOD PRESSURE: 73 MMHG | SYSTOLIC BLOOD PRESSURE: 125 MMHG | OXYGEN SATURATION: 96 % | HEART RATE: 75 BPM

## 2021-02-04 DIAGNOSIS — E86.0 DEHYDRATION: Primary | ICD-10-CM

## 2021-02-04 DIAGNOSIS — R11.0 NAUSEA: ICD-10-CM

## 2021-02-04 DIAGNOSIS — R42 DIZZINESS: ICD-10-CM

## 2021-02-04 PROCEDURE — 96361 HYDRATE IV INFUSION ADD-ON: CPT

## 2021-02-04 PROCEDURE — 99284 EMERGENCY DEPT VISIT MOD MDM: CPT

## 2021-02-04 PROCEDURE — 99284 EMERGENCY DEPT VISIT MOD MDM: CPT | Performed by: EMERGENCY MEDICINE

## 2021-02-04 PROCEDURE — 96374 THER/PROPH/DIAG INJ IV PUSH: CPT

## 2021-02-04 PROCEDURE — 96375 TX/PRO/DX INJ NEW DRUG ADDON: CPT

## 2021-02-04 RX ORDER — KETOROLAC TROMETHAMINE 30 MG/ML
15 INJECTION, SOLUTION INTRAMUSCULAR; INTRAVENOUS ONCE
Status: COMPLETED | OUTPATIENT
Start: 2021-02-04 | End: 2021-02-04

## 2021-02-04 RX ORDER — ONDANSETRON 2 MG/ML
4 INJECTION INTRAMUSCULAR; INTRAVENOUS ONCE
Status: COMPLETED | OUTPATIENT
Start: 2021-02-04 | End: 2021-02-04

## 2021-02-04 RX ADMIN — SODIUM CHLORIDE 1000 ML: 0.9 INJECTION, SOLUTION INTRAVENOUS at 23:11

## 2021-02-04 RX ADMIN — ONDANSETRON 4 MG: 2 INJECTION INTRAMUSCULAR; INTRAVENOUS at 23:11

## 2021-02-04 RX ADMIN — KETOROLAC TROMETHAMINE 15 MG: 30 INJECTION, SOLUTION INTRAMUSCULAR; INTRAVENOUS at 23:11

## 2021-02-04 NOTE — Clinical Note
accompanied Mahogany Leonard to the emergency department on 2/4/2021  Return date if applicable: 29/93/9130        If you have any questions or concerns, please don't hesitate to call        Teo Caro MD

## 2021-02-04 NOTE — Clinical Note
Wesley Kessler was seen and treated in our emergency department on 2/4/2021  Diagnosis:      Luis  may return to work on return date  He may return on this date: 02/08/2021    Or sooner as tolerated     If you have any questions or concerns, please don't hesitate to call        Margarita Noonan MD    ______________________________           _______________          _______________  Hospital Representative                              Date                                Time

## 2021-02-05 RX ORDER — ONDANSETRON 4 MG/1
4 TABLET, ORALLY DISINTEGRATING ORAL EVERY 6 HOURS PRN
Qty: 20 TABLET | Refills: 0 | Status: SHIPPED | OUTPATIENT
Start: 2021-02-05

## 2021-02-05 NOTE — DISCHARGE INSTRUCTIONS
Patient Instructions: Today you were seen in the emergency department for dehydration  We examined you and determined that you would be able to be discharged  You should take zofran for your nausea  You should follow up with your primary care doctor  Please return to the emergency department if your symptoms get worse, you develop a fever, or you have any other symptoms we discussed today prior to discharge  Nice to meet you! Best of luck with everything!

## 2021-02-05 NOTE — ED PROVIDER NOTES
Final Diagnosis:  1  Dehydration    2  Dizziness    3  Nausea         Chief Complaint   Patient presents with    Dizziness     c/o dizziness and some vomiting  pt states, "I think I may be dehydrated "       ASSESSMENT + PLAN:   - Nursing note reviewed  1  Dizziness, nausea, vomiting  -patient dry on examination, does feel like he over exerts himself, will give him IV fluids and p o  Fluids after IV Zofran  -normal neurologic examination, symptoms improved after IV fluids and p o  Fluids    2  Headache  -IV Toradol, normal neurologic examination, no red flags, feels as though this is similar in headache for him      Final Dispo   Patient was reassessed  Vital signs stable  Patient and/or family given discharge instructions and return precautions  Patient and/or family was reassured  The patient and/or family vocalizes understanding  Answered all of the patient's and/or family's questions  Will follow up with PCP  Patient and/or family are agreeable to the plan  Medications   ondansetron (ZOFRAN) injection 4 mg (4 mg Intravenous Given 2/4/21 2311)   ketorolac (TORADOL) injection 15 mg (15 mg Intravenous Given 2/4/21 2311)   sodium chloride 0 9 % bolus 1,000 mL (0 mL Intravenous Stopped 2/5/21 0032)     Time reflects when diagnosis was documented in both MDM as applicable and the Disposition within this note     Time User Action Codes Description Comment    2/4/2021 10:57 PM Patricia Gouty Add [E86 0] Dehydration     2/4/2021 10:57 PM Patricia Gouty Add [R42] Dizziness     2/5/2021 12:06 AM Patricia Gouty Add [R11 0] Nausea       ED Disposition     ED Disposition Condition Date/Time Comment    Discharge Stable Thu Feb 4, 2021 10:57 PM Diana Carrillo discharge to home/self care              Follow-up Information     Follow up With Specialties Details Why Contact Info    [employer]  Call   [phone]          Patient's Medications   Discharge Prescriptions    ONDANSETRON (ZOFRAN-ODT) 4 MG DISINTEGRATING TABLET Take 1 tablet (4 mg total) by mouth every 6 (six) hours as needed for nausea or vomiting       Start Date: 2/5/2021  End Date: --       Order Dose: 4 mg       Quantity: 20 tablet    Refills: 0     No discharge procedures on file  Prior to Admission Medications   Prescriptions Last Dose Informant Patient Reported? Taking?   naproxen (NAPROSYN) 500 mg tablet   No No   Sig: Take 1 tablet (500 mg total) by mouth 2 (two) times a day with meals for 5 days   oxyCODONE-acetaminophen (PERCOCET) 5-325 mg per tablet Not Taking at Unknown time  No No   Sig: Take 1 tablet by mouth every 8 (eight) hours as needed for severe painMax Daily Amount: 3 tablets   Patient not taking: Reported on 2/4/2021      Facility-Administered Medications: None       History of Present Illness: This is a 31 y o  male presenting today for evaluation of dizziness, nausea, vomiting  Patient says that he woke up this morning and he felt as though his little hangover, even though he did not drink last night  Patient endorses some mild dizziness that is associated with some vomiting  He says he has no was other associated neurologic findings  He says that he feels though aches over exert himself  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, taking about cars, 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  No recent fever, chills, chest pain, shortness of breath  No diarrhea  Patient does endorse some mild headaches associated with this  He says that these are typical for him when he is dehydrated  He feels is not able to drink asthma water today  Dizziness is more improved currently  - No language barrier    - History obtained from patient and chart   - There are no limitations to the history obtained  - Previous charting was reviewed  Some data reviewed included below for ease of access whether or not it is relevant to this patient encounter        Past Medical, Past Surgical History:    has a past medical history of ADHD (attention deficit hyperactivity disorder) and Asthma  has a past surgical history that includes No past surgeries  Allergies:   No Known Allergies    Social and Family History:     Social History     Substance and Sexual Activity   Alcohol Use Yes    Comment: occasional      Social History     Tobacco Use   Smoking Status Former Smoker    Packs/day: 0 50   Smokeless Tobacco Never Used     Social History     Substance and Sexual Activity   Drug Use Yes    Types: Marijuana    Comment: last use: 1/21/21       Review of Systems:   Review of Systems   Constitutional: Negative for chills, diaphoresis and fever  HENT: Negative  Eyes: Negative  Negative for visual disturbance  Respiratory: Negative  Negative for shortness of breath  Cardiovascular: Negative  Negative for chest pain  Gastrointestinal: Positive for nausea and vomiting  Negative for abdominal pain  Endocrine: Negative  Genitourinary: Negative  Musculoskeletal: Negative  Negative for myalgias  Skin: Negative  Negative for rash  Allergic/Immunologic: Negative  Neurological: Positive for dizziness and headaches  Negative for weakness, light-headedness and numbness  Hematological: Negative  Psychiatric/Behavioral: Negative  All other systems reviewed and are negative  Physical Examination     Vitals:    02/04/21 2005 02/04/21 2314   BP: 127/71 125/73   BP Location: Right arm Right arm   Pulse: 78 75   Resp: 18 18   Temp: 98 1 °F (36 7 °C) 98 °F (36 7 °C)   TempSrc: Oral Oral   SpO2: 94% 96%     Vitals reviewed by me  Physical Exam  Vitals signs and nursing note reviewed  Constitutional:       General: He is not in acute distress  Appearance: He is well-developed  He is not ill-appearing  HENT:      Head: Normocephalic and atraumatic  Right Ear: External ear normal       Left Ear: External ear normal       Mouth/Throat:      Pharynx: No oropharyngeal exudate  Eyes:      Extraocular Movements: Extraocular movements intact  Conjunctiva/sclera: Conjunctivae normal       Pupils: Pupils are equal, round, and reactive to light  Neck:      Musculoskeletal: Normal range of motion and neck supple  Cardiovascular:      Rate and Rhythm: Normal rate  Pulmonary:      Effort: Pulmonary effort is normal       Breath sounds: Normal breath sounds  Abdominal:      General: There is no distension  Palpations: Abdomen is soft  Tenderness: There is no abdominal tenderness  Hernia: A hernia is present  Musculoskeletal: Normal range of motion  Skin:     General: Skin is warm and dry  Neurological:      General: No focal deficit present  Mental Status: He is alert and oriented to person, place, and time  Cranial Nerves: No cranial nerve deficit  Sensory: No sensory deficit  Motor: No weakness or abnormal muscle tone  Coordination: Coordination normal       Gait: Gait normal       Deep Tendon Reflexes: Reflexes normal                               No orders to display     No orders of the defined types were placed in this encounter  Labs:   Labs Reviewed - No data to display    Imaging:   No results found  Final Diagnosis:  1  Dehydration    2  Dizziness    3  Nausea        Code Status: No Order    Portions of the record may have been created with voice recognition software  Occasional wrong word or "sound a like" substitutions may have occurred due to the inherent limitations of voice recognition software  Read the chart carefully and recognize, using context, where substitutions have occurred      Electronically signed by:  Julio Cesar Larose, PGY 2, MD Esthela Guerrier MD  02/05/21 9272

## 2021-02-05 NOTE — ED ATTENDING ATTESTATION
2/4/2021  IMelany DO, saw and evaluated the patient  I have discussed the patient with the resident/non-physician practitioner and agree with the resident's/non-physician practitioner's findings, Plan of Care, and MDM as documented in the resident's/non-physician practitioner's note, except where noted  All available labs and Radiology studies were reviewed  I was present for key portions of any procedure(s) performed by the resident/non-physician practitioner and I was immediately available to provide assistance  At this point I agree with the current assessment done in the Emergency Department  I have conducted an independent evaluation of this patient a history and physical is as follows:    ED Course     32 yo healthy male who woke up this am feeling lightheaded, nauseas and thrown up a few times today  Feels dehydrated due to aggressive snow shoveling recently  Agree with IVF, zofran, toradol  Pt feels much better after IVF and above meds       Final diagnoses:   Dehydration   Dizziness   Nausea         Critical Care Time  Procedures

## 2022-07-20 ENCOUNTER — HOSPITAL ENCOUNTER (EMERGENCY)
Facility: HOSPITAL | Age: 32
Discharge: HOME | End: 2022-07-20
Attending: EMERGENCY MEDICINE | Admitting: EMERGENCY MEDICINE
Payer: COMMERCIAL

## 2022-07-20 VITALS
TEMPERATURE: 98.2 F | SYSTOLIC BLOOD PRESSURE: 124 MMHG | HEART RATE: 89 BPM | OXYGEN SATURATION: 98 % | WEIGHT: 175.04 LBS | DIASTOLIC BLOOD PRESSURE: 63 MMHG | RESPIRATION RATE: 20 BRPM
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
## 2022-07-20 PROCEDURE — 90471 IMMUNIZATION ADMIN: CPT

## 2022-07-20 PROCEDURE — 90675 RABIES VACCINE IM: CPT | Performed by: EMERGENCY MEDICINE

## 2022-07-20 PROCEDURE — 99284 EMERGENCY DEPT VISIT MOD MDM: CPT | Performed by: EMERGENCY MEDICINE

## 2022-07-20 PROCEDURE — 90375 RABIES IG IM/SC: CPT | Performed by: EMERGENCY MEDICINE

## 2022-07-20 PROCEDURE — 12001 RPR S/N/AX/GEN/TRNK 2.5CM/<: CPT | Performed by: EMERGENCY MEDICINE

## 2022-07-20 PROCEDURE — 99283 EMERGENCY DEPT VISIT LOW MDM: CPT

## 2022-07-20 PROCEDURE — 96372 THER/PROPH/DIAG INJ SC/IM: CPT

## 2022-07-20 RX ORDER — AMOXICILLIN AND CLAVULANATE POTASSIUM 875; 125 MG/1; MG/1
1 TABLET, FILM COATED ORAL EVERY 12 HOURS
Qty: 10 TABLET | Refills: 0 | Status: SHIPPED | OUTPATIENT
Start: 2022-07-20 | End: 2022-07-25

## 2022-07-20 RX ORDER — AMOXICILLIN AND CLAVULANATE POTASSIUM 875; 125 MG/1; MG/1
1 TABLET, FILM COATED ORAL ONCE
Status: COMPLETED | OUTPATIENT
Start: 2022-07-20 | End: 2022-07-20

## 2022-07-20 RX ORDER — LIDOCAINE HYDROCHLORIDE AND EPINEPHRINE 10; 10 MG/ML; UG/ML
5 INJECTION, SOLUTION INFILTRATION; PERINEURAL ONCE
Status: COMPLETED | OUTPATIENT
Start: 2022-07-20 | End: 2022-07-20

## 2022-07-20 RX ADMIN — LIDOCAINE HYDROCHLORIDE,EPINEPHRINE BITARTRATE 5 ML: 10; .01 INJECTION, SOLUTION INFILTRATION; PERINEURAL at 03:35

## 2022-07-20 RX ADMIN — Medication 1 ML: at 03:35

## 2022-07-20 RX ADMIN — AMOXICILLIN AND CLAVULANATE POTASSIUM 1 TABLET: 875; 125 TABLET, FILM COATED ORAL at 03:38

## 2022-07-20 RX ADMIN — RABIES IMMUNE GLOBULIN (HUMAN) 1590 UNITS: 300 INJECTION, SOLUTION INFILTRATION; INTRAMUSCULAR at 03:35

## 2022-07-20 NOTE — ED PROVIDER NOTES
History  Chief Complaint   Patient presents with    Dog Bite     Patient got bit on right hand by a stray dog, unknown about dog's vaccination status, has bite to right inner hand between thumb and pointer finger  Unsure of tetanus  This is a 63-year-old male who presents with a dog bite  Patient has significant other found a stray dog on the street the seem dehydrated  They tried to catch dog, however, it bit the patient on the right hand  The dog ran away  Patient is up-to-date on his tetanus  Prior to Admission Medications   Prescriptions Last Dose Informant Patient Reported? Taking?   naproxen (NAPROSYN) 500 mg tablet   No No   Sig: Take 1 tablet (500 mg total) by mouth 2 (two) times a day with meals for 5 days   ondansetron (ZOFRAN-ODT) 4 mg disintegrating tablet   No No   Sig: Take 1 tablet (4 mg total) by mouth every 6 (six) hours as needed for nausea or vomiting   oxyCODONE-acetaminophen (PERCOCET) 5-325 mg per tablet   No No   Sig: Take 1 tablet by mouth every 8 (eight) hours as needed for severe painMax Daily Amount: 3 tablets   Patient not taking: Reported on 2/4/2021      Facility-Administered Medications: None       Past Medical History:   Diagnosis Date    ADHD (attention deficit hyperactivity disorder)     Asthma        Past Surgical History:   Procedure Laterality Date    NO PAST SURGERIES         History reviewed  No pertinent family history  I have reviewed and agree with the history as documented      E-Cigarette/Vaping    E-Cigarette Use Never User      E-Cigarette/Vaping Substances    Nicotine No     THC No     CBD No     Flavoring No     Other No     Unknown No      Social History     Tobacco Use    Smoking status: Former Smoker     Packs/day: 0 50    Smokeless tobacco: Never Used   Vaping Use    Vaping Use: Never used   Substance Use Topics    Alcohol use: Yes     Comment: occasional     Drug use: Yes     Types: Marijuana     Comment: last use: 1/21/21 Review of Systems   Constitutional: Negative for chills and fever  HENT: Negative for congestion, rhinorrhea, sore throat and trouble swallowing  Respiratory: Negative for cough, chest tightness, shortness of breath and wheezing  Cardiovascular: Negative for chest pain and palpitations  Gastrointestinal: Negative for abdominal pain, blood in stool, diarrhea, nausea and vomiting  Musculoskeletal: Negative for back pain and neck pain  Skin: Positive for wound  All other systems reviewed and are negative  Physical Exam  Physical Exam  Constitutional:       General: He is not in acute distress  Appearance: Normal appearance  He is well-developed  HENT:      Mouth/Throat:      Pharynx: Uvula midline  Eyes:      General: Lids are normal       Conjunctiva/sclera: Conjunctivae normal       Pupils: Pupils are equal, round, and reactive to light  Neck:      Thyroid: No thyroid mass or thyromegaly  Trachea: Trachea normal    Cardiovascular:      Rate and Rhythm: Normal rate and regular rhythm  Pulmonary:      Effort: Pulmonary effort is normal  No tachypnea  Abdominal:      General: Abdomen is flat  Palpations: Abdomen is soft  Musculoskeletal:      Comments: 0 5 cm, shallow laceration to the webspace of the thumb and index finger  Skin:     General: Skin is warm and dry  Neurological:      Mental Status: He is alert  Psychiatric:         Speech: Speech normal          Behavior: Behavior normal  Behavior is cooperative  Thought Content:  Thought content normal          Vital Signs  ED Triage Vitals [07/20/22 0022]   Temperature Pulse Respirations Blood Pressure SpO2   98 2 °F (36 8 °C) 89 20 124/63 98 %      Temp Source Heart Rate Source Patient Position - Orthostatic VS BP Location FiO2 (%)   Oral Monitor Sitting Right arm --      Pain Score       8           Vitals:    07/20/22 0022   BP: 124/63   Pulse: 89   Patient Position - Orthostatic VS: Sitting Visual Acuity      ED Medications  Medications   rabies immune globulin, human (HyperRAB) injection 1,590 Units (has no administration in time range)   rabies vaccine, human diploid (IMOVAX RABIES) IM injection 1 mL (has no administration in time range)   amoxicillin-clavulanate (AUGMENTIN) 875-125 mg per tablet 1 tablet (has no administration in time range)   lidocaine-epinephrine (XYLOCAINE/EPINEPHRINE) 1 %-1:100,000 injection 5 mL (5 mL Infiltration Given 7/20/22 0335)       Diagnostic Studies  Results Reviewed     None                 No orders to display              Procedures  Laceration repair    Date/Time: 7/20/2022 3:35 AM  Performed by: Twila Oreilly MD  Authorized by: Twila Oreilly MD   Consent: Verbal consent obtained  Risks and benefits: risks, benefits and alternatives were discussed  Consent given by: patient  Patient understanding: patient states understanding of the procedure being performed  Patient identity confirmed: verbally with patient  Laceration length: 0 5 cm  Tendon involvement: none  Nerve involvement: none  Vascular damage: no  Anesthesia: local infiltration    Anesthesia:  Local Anesthetic: lidocaine 1% with epinephrine    Sedation:  Patient sedated: no        Procedure Details:  Preparation: Patient was prepped and draped in the usual sterile fashion  Irrigation solution: saline  Irrigation method: syringe  Amount of cleaning: extensive  Debridement: none  Degree of undermining: none  Wound skin closure material used: 5-0 ethilon  Number of sutures: 1  Technique: simple  Approximation: loose  Approximation difficulty: simple  Patient tolerance: patient tolerated the procedure well with no immediate complications               ED Course                                             MDM  Number of Diagnoses or Management Options  Diagnosis management comments: Will clean and loosely approximate the laceration  Start on Augmentin  Rabies vaccine and immunoglobulin      - Rabies Immunoglobulin to be administered on Day 0 (20 U/kg to be given around wounds with remaining to be given IM)  -  Rabies vaccine to be given on Days 0, 3, 7, and 14  Disposition  Final diagnoses:   Dog bite, initial encounter     Time reflects when diagnosis was documented in both MDM as applicable and the Disposition within this note     Time User Action Codes Description Comment    7/20/2022  3:30 AM Marilu Nails Add Pleasant Kurk  0XXA] Dog bite, initial encounter       ED Disposition     ED Disposition   Discharge    Condition   Stable    Date/Time   Wed Jul 20, 2022  3:30 AM    [address] discharge to home/self care  Follow-up Information     Follow up With Specialties Details Why Contact Info Additional Information    [address] [facility] Ur[facility] Call  to make sure they carry the rabies vaccine  And for suture removal in 7 to 10 days  [address], [address] Now [facility], [phone]     Via the [address] (North/South) Take [address] toward [facility]  Take the [facility] [address]  Keep right and follow signs for [address]/ [place]  Merge onto [address] [place] [address]  In a half mile, take the exit for [address] toward [facility]  In 0 7 miles take the [facility] [employer]  Merge onto [facility]  In 500 feet, turn left on [address] and drive 0 3 miles  [address] will be on your left  [address] ([address]) [address] toward [facility]  Take the [facility] [employer]  Merge onto [facility]  In 500 feet, turn left on [address] and drive 0 3 miles  [address] will be on your left  [address]) Take [address] to [address] towards [facility]  In 0 7 miles take the [facility] [employer]  Merge onto [facility]   In 500 feet, turn left on [address] and drive [address]  [address] will be on your left  [facility] Emergency Department Emergency Medicine Go to  for rabies vaccination series and suture removal  [facility] [id number] [address] [facility] Emergency Department, [address] [facility] [address] [place], [place], [address]          Patient's Medications   Discharge Prescriptions    AMOXICILLIN-CLAVULANATE (AUGMENTIN) 875-125 MG PER TABLET    Take 1 tablet by mouth every 12 (twelve) hours for 5 days       Start Date: 7/20/2022 End Date: 7/25/2022       Order Dose: 1 tablet       Quantity: 10 tablet    Refills: 0       No discharge procedures on file      PDMP Review     None          ED Provider  Electronically Signed by           Donald Reyes MD  07/20/22 4620

## 2022-07-28 ENCOUNTER — OFFICE VISIT (OUTPATIENT)
Dept: URGENT CARE | Age: 32
End: 2022-07-28
Payer: COMMERCIAL

## 2022-07-28 VITALS
RESPIRATION RATE: 18 BRPM | HEART RATE: 92 BPM | SYSTOLIC BLOOD PRESSURE: 131 MMHG | DIASTOLIC BLOOD PRESSURE: 67 MMHG | TEMPERATURE: 98.6 F | OXYGEN SATURATION: 97 %

## 2022-07-28 DIAGNOSIS — Z48.02 ENCOUNTER FOR REMOVAL OF SUTURES: Primary | ICD-10-CM

## 2022-07-28 DIAGNOSIS — Z23 NEED FOR RABIES VACCINATION: ICD-10-CM

## 2022-07-28 PROCEDURE — 90675 RABIES VACCINE IM: CPT

## 2022-07-28 PROCEDURE — 99213 OFFICE O/P EST LOW 20 MIN: CPT | Performed by: NURSE PRACTITIONER

## 2022-07-28 NOTE — PROGRESS NOTES
NAME: Letitia Iniguez is a 29 y o  male  : 1990    MRN: [id number]    /67   Pulse 92   Temp 98 6 °F (37 °C) (Tympanic)   Resp 18   SpO2 97%     Assessment and Plan   Encounter for removal of sutures [Z48 02]  1  Encounter for removal of sutures  Rabies vaccine IM (human diploid, IMOVAX)   2  Need for rabies vaccination  Rabies vaccine IM (human diploid, IMOVAX)       Amirah Oneill was seen today for suture / staple removal     Diagnoses and all orders for this visit:    Encounter for removal of sutures  -     Rabies vaccine IM (human diploid, IMOVAX)    Need for rabies vaccination  -     Rabies vaccine IM (human diploid, IMOVAX)    vaccine given to pt, aware for pt to return on 8/3    Patient Instructions   There are no Patient Instructions on file for this visit  Proceed to the nearest ER if symptoms worsen, Follow up with your PCP  Continue to social distance, wash your hands, and wear your masks  Please continue to follow the CDC  gov guidelines daily for they are subject to change on COVID-19    Chief Complaint     Chief Complaint   Patient presents with    Suture / Staple Removal     Pt presents for suture removal right hand  History of Present Illness     28 yo M here today for suture removal, also needs rabies vaccine  Pt was given 1st one on  missed day three and here today on day 8  He will be given a vaccine today and aware to get his next one when scheduled  Review of Systems   Review of Systems   Constitutional: Negative for fatigue and fever  Musculoskeletal: Negative  Skin: Positive for wound (wound to the right hand suture was removed  )  Psychiatric/Behavioral: Negative            Current Medications       Current Outpatient Medications:     naproxen (NAPROSYN) 500 mg tablet, Take 1 tablet (500 mg total) by mouth 2 (two) times a day with meals for 5 days, Disp: 10 tablet, Rfl: 0    ondansetron (ZOFRAN-ODT) 4 mg disintegrating tablet, Take 1 tablet (4 mg total) by mouth every 6 (six) hours as needed for nausea or vomiting, Disp: 20 tablet, Rfl: 0    oxyCODONE-acetaminophen (PERCOCET) 5-325 mg per tablet, Take 1 tablet by mouth every 8 (eight) hours as needed for severe painMax Daily Amount: 3 tablets (Patient not taking: Reported on 2/4/2021), Disp: 6 tablet, Rfl: 0    Current Allergies     Allergies as of 07/28/2022    (No Known Allergies)              Past Medical History:   Diagnosis Date    ADHD (attention deficit hyperactivity disorder)     Asthma        Past Surgical History:   Procedure Laterality Date    NO PAST SURGERIES         History reviewed  No pertinent family history  Medications have been verified  The following portions of the patient's history were reviewed and updated as appropriate: allergies, current medications, past family history, past medical history, past social history, past surgical history and problem list     Objective   /67   Pulse 92   Temp 98 6 °F (37 °C) (Tympanic)   Resp 18   SpO2 97%      Physical Exam     Physical Exam  Skin:     General: Skin is warm  Capillary Refill: Capillary refill takes less than 2 seconds  Note: Portions of this record may have been created with voice recognition software  Occasional wrong word or "sound a like" substitutions may have occurred due to the inherent limitations of voice recognition software  Please read the chart carefully and recognize, using context, where substitutions have occurred  EDITH Diaz

## 2022-10-12 ENCOUNTER — APPOINTMENT (EMERGENCY)
Dept: CT IMAGING | Facility: HOSPITAL | Age: 32
End: 2022-10-12
Payer: COMMERCIAL

## 2022-10-12 ENCOUNTER — HOSPITAL ENCOUNTER (EMERGENCY)
Facility: HOSPITAL | Age: 32
Discharge: HOME | End: 2022-10-12
Attending: EMERGENCY MEDICINE
Payer: COMMERCIAL

## 2022-10-12 VITALS
DIASTOLIC BLOOD PRESSURE: 64 MMHG | RESPIRATION RATE: 18 BRPM | SYSTOLIC BLOOD PRESSURE: 104 MMHG | OXYGEN SATURATION: 99 % | TEMPERATURE: 98 F | WEIGHT: 162.92 LBS | HEART RATE: 74 BPM

## 2022-10-12 DIAGNOSIS — R10.9 LEFT FLANK PAIN: ICD-10-CM

## 2022-10-12 DIAGNOSIS — K40.90 LEFT INGUINAL HERNIA: Primary | ICD-10-CM

## 2022-10-12 LAB
ALBUMIN SERPL BCP-MCNC: 3.6 G/DL (ref 3.5–5)
ALP SERPL-CCNC: 55 U/L (ref 46–116)
ALT SERPL W P-5'-P-CCNC: 15 U/L (ref 12–78)
ANION GAP SERPL CALCULATED.3IONS-SCNC: 6 MMOL/L (ref 4–13)
AST SERPL W P-5'-P-CCNC: 13 U/L (ref 5–45)
BASOPHILS # BLD AUTO: 0.02 THOUSANDS/ΜL (ref 0–0.1)
BASOPHILS NFR BLD AUTO: 0 % (ref 0–1)
BILIRUB SERPL-MCNC: 0.66 MG/DL (ref 0.2–1)
BILIRUB UR QL STRIP: NEGATIVE
BUN SERPL-MCNC: 7 MG/DL (ref 5–25)
CALCIUM SERPL-MCNC: 9 MG/DL (ref 8.3–10.1)
CHLORIDE SERPL-SCNC: 104 MMOL/L (ref 96–108)
CLARITY UR: ABNORMAL
CO2 SERPL-SCNC: 28 MMOL/L (ref 21–32)
COLOR UR: YELLOW
CREAT SERPL-MCNC: 0.94 MG/DL (ref 0.6–1.3)
EOSINOPHIL # BLD AUTO: 0.09 THOUSAND/ΜL (ref 0–0.61)
EOSINOPHIL NFR BLD AUTO: 1 % (ref 0–6)
ERYTHROCYTE [DISTWIDTH] IN BLOOD BY AUTOMATED COUNT: 13.2 % (ref 11.6–15.1)
GFR SERPL CREATININE-BSD FRML MDRD: 108 ML/MIN/1.73SQ M
GLUCOSE SERPL-MCNC: 92 MG/DL (ref 65–140)
GLUCOSE UR STRIP-MCNC: NEGATIVE MG/DL
HCT VFR BLD AUTO: 41.9 % (ref 36.5–49.3)
HGB BLD-MCNC: 14.6 G/DL (ref 12–17)
HGB UR QL STRIP.AUTO: NEGATIVE
IMM GRANULOCYTES # BLD AUTO: 0.03 THOUSAND/UL (ref 0–0.2)
IMM GRANULOCYTES NFR BLD AUTO: 0 % (ref 0–2)
KETONES UR STRIP-MCNC: ABNORMAL MG/DL
LEUKOCYTE ESTERASE UR QL STRIP: NEGATIVE
LIPASE SERPL-CCNC: 61 U/L (ref 73–393)
LYMPHOCYTES # BLD AUTO: 1.87 THOUSANDS/ΜL (ref 0.6–4.47)
LYMPHOCYTES NFR BLD AUTO: 24 % (ref 14–44)
MCH RBC QN AUTO: 28.6 PG (ref 26.8–34.3)
MCHC RBC AUTO-ENTMCNC: 34.8 G/DL (ref 31.4–37.4)
MCV RBC AUTO: 82 FL (ref 82–98)
MONOCYTES # BLD AUTO: 0.46 THOUSAND/ΜL (ref 0.17–1.22)
MONOCYTES NFR BLD AUTO: 6 % (ref 4–12)
NEUTROPHILS # BLD AUTO: 5.35 THOUSANDS/ΜL (ref 1.85–7.62)
NEUTS SEG NFR BLD AUTO: 69 % (ref 43–75)
NITRITE UR QL STRIP: NEGATIVE
NRBC BLD AUTO-RTO: 0 /100 WBCS
PH UR STRIP.AUTO: 8 [PH]
PLATELET # BLD AUTO: 235 THOUSANDS/UL (ref 149–390)
PMV BLD AUTO: 9.2 FL (ref 8.9–12.7)
POTASSIUM SERPL-SCNC: 3.9 MMOL/L (ref 3.5–5.3)
PROT SERPL-MCNC: 7.1 G/DL (ref 6.4–8.4)
PROT UR STRIP-MCNC: NEGATIVE MG/DL
RBC # BLD AUTO: 5.1 MILLION/UL (ref 3.88–5.62)
SODIUM SERPL-SCNC: 138 MMOL/L (ref 135–147)
SP GR UR STRIP.AUTO: 1.02 (ref 1–1.03)
UROBILINOGEN UR QL STRIP.AUTO: 0.2 E.U./DL
WBC # BLD AUTO: 7.82 THOUSAND/UL (ref 4.31–10.16)

## 2022-10-12 PROCEDURE — 36415 COLL VENOUS BLD VENIPUNCTURE: CPT | Performed by: PHYSICIAN ASSISTANT

## 2022-10-12 PROCEDURE — 99284 EMERGENCY DEPT VISIT MOD MDM: CPT | Performed by: PHYSICIAN ASSISTANT

## 2022-10-12 PROCEDURE — 81003 URINALYSIS AUTO W/O SCOPE: CPT | Performed by: PHYSICIAN ASSISTANT

## 2022-10-12 PROCEDURE — 74177 CT ABD & PELVIS W/CONTRAST: CPT

## 2022-10-12 PROCEDURE — 80053 COMPREHEN METABOLIC PANEL: CPT | Performed by: PHYSICIAN ASSISTANT

## 2022-10-12 PROCEDURE — 99284 EMERGENCY DEPT VISIT MOD MDM: CPT

## 2022-10-12 PROCEDURE — 85025 COMPLETE CBC W/AUTO DIFF WBC: CPT | Performed by: PHYSICIAN ASSISTANT

## 2022-10-12 PROCEDURE — 83690 ASSAY OF LIPASE: CPT | Performed by: PHYSICIAN ASSISTANT

## 2022-10-12 PROCEDURE — G1004 CDSM NDSC: HCPCS

## 2022-10-12 RX ORDER — IBUPROFEN 600 MG/1
600 TABLET ORAL ONCE
Status: COMPLETED | OUTPATIENT
Start: 2022-10-12 | End: 2022-10-12

## 2022-10-12 RX ADMIN — IOHEXOL 100 ML: 350 INJECTION, SOLUTION INTRAVENOUS at 21:28

## 2022-10-12 RX ADMIN — IBUPROFEN 600 MG: 600 TABLET, FILM COATED ORAL at 22:33

## 2022-10-13 NOTE — ED PROVIDER NOTES
History  Chief Complaint   Patient presents with   • Back Pain     Reports worsening lower back pain, abdominal pain and left testicle  Denies dysuria  Taking erica seltzer without relief  Has upcoming hernia surgery  Jonel Marin is a 29 y o   male with PMH of asthma, ADHD ventral hernia, left inguinal hernia who presents to the emergency department with left lower quadrant abdominal pain radiating to the back  Patient states that he woke up this morning and had some left lower quadrant abdominal pressure radiating to his left flank and back  States this has been intermittent over the last 2 months since being diagnosed with an inguinal hernia  States that he had some bulging in his left inguinal area which was easily reduced which did seem to help his pressure  Pain is made worse with certain positions  Denies fevers, chills, sweats  No saddle anesthesia  Full strength and sensation bilateral lower extremities  No loss of bowel or bladder function  Denies IV drug use  Denies history of cancer  Denies direct trauma  No unexpected weight loss  No long term steroid use  No pulsatile abdominal mass  No HIV, Transplant or systemic corticosteroids  No midline tenderness  Denies any urinary frequency, urgency, dysuria hematuria, nausea, vomiting, diarrhea trauma, rashes     He denies any testicle or scrotal pain  Patient is eating and drinking fine, having normal bowel movements- most recent was last evening              History provided by:  Patient   used: No    Abdominal Pain  Pain location:  LLQ  Pain quality: aching and pressure    Pain radiates to:  L flank  Pain severity:  Mild  Onset quality:  Gradual  Duration:  1 day  Timing:  Constant  Progression:  Unchanged  Chronicity:  Recurrent  Context: not alcohol use, not awakening from sleep, not diet changes, not eating, not laxative use, not medication withdrawal, not previous surgeries, not recent illness, not recent sexual activity, not recent travel, not retching, not sick contacts, not suspicious food intake and not trauma    Relieved by: Palpation of the area/reduction of hernia  Worsened by:  Nothing  Associated symptoms: no anorexia, no belching, no chest pain, no chills, no constipation, no cough, no diarrhea, no dysuria, no fatigue, no fever, no flatus, no hematemesis, no hematochezia, no hematuria, no melena, no nausea, no shortness of breath, no sore throat and no vomiting        Prior to Admission Medications   Prescriptions Last Dose Informant Patient Reported? Taking?   naproxen (NAPROSYN) 500 mg tablet   No No   Sig: Take 1 tablet (500 mg total) by mouth 2 (two) times a day with meals for 5 days   ondansetron (ZOFRAN-ODT) 4 mg disintegrating tablet   No No   Sig: Take 1 tablet (4 mg total) by mouth every 6 (six) hours as needed for nausea or vomiting   oxyCODONE-acetaminophen (PERCOCET) 5-325 mg per tablet   No No   Sig: Take 1 tablet by mouth every 8 (eight) hours as needed for severe painMax Daily Amount: 3 tablets   Patient not taking: Reported on 2/4/2021      Facility-Administered Medications: None       Past Medical History:   Diagnosis Date   • ADHD (attention deficit hyperactivity disorder)    • Asthma        Past Surgical History:   Procedure Laterality Date   • NO PAST SURGERIES         History reviewed  No pertinent family history  I have reviewed and agree with the history as documented      E-Cigarette/Vaping   • E-Cigarette Use Never User      E-Cigarette/Vaping Substances   • Nicotine No    • THC No    • CBD No    • Flavoring No    • Other No    • Unknown No      Social History     Tobacco Use   • Smoking status: Former Smoker     Packs/day: 0 50   • Smokeless tobacco: Never Used   Vaping Use   • Vaping Use: Never used   Substance Use Topics   • Alcohol use: Yes     Comment: occasional    • Drug use: Yes     Types: Marijuana     Comment: last use: 1/21/21       Review of Systems   Constitutional: Negative for activity change, appetite change, chills, fatigue, fever and unexpected weight change  HENT: Negative for congestion, ear discharge, postnasal drip, rhinorrhea, sinus pressure, sinus pain and sore throat  Respiratory: Negative for apnea, cough, choking, chest tightness, shortness of breath, wheezing and stridor  Cardiovascular: Negative for chest pain, palpitations and leg swelling  Gastrointestinal: Positive for abdominal pain  Negative for anorexia, blood in stool, constipation, diarrhea, flatus, hematemesis, hematochezia, melena, nausea and vomiting  Genitourinary: Positive for flank pain  Negative for dysuria, frequency, hematuria and urgency  Musculoskeletal: Negative for arthralgias, myalgias and neck stiffness  Skin: Negative for color change, pallor, rash and wound  Neurological: Negative for dizziness, tremors, seizures, syncope, light-headedness, numbness and headaches  All other systems reviewed and are negative  Physical Exam  Physical Exam  Vitals and nursing note reviewed  Exam conducted with a chaperone present (Primary nurse)  Constitutional:       General: He is not in acute distress  Appearance: Normal appearance  He is normal weight  He is not ill-appearing or toxic-appearing  HENT:      Head: Normocephalic and atraumatic  Nose: Nose normal       Mouth/Throat:      Mouth: Mucous membranes are moist       Pharynx: Oropharynx is clear  No oropharyngeal exudate  Eyes:      Pupils: Pupils are equal, round, and reactive to light  Cardiovascular:      Rate and Rhythm: Normal rate and regular rhythm  Pulses: Normal pulses  Heart sounds: Normal heart sounds  No murmur heard  No friction rub  No gallop  Pulmonary:      Effort: Pulmonary effort is normal  No respiratory distress  Breath sounds: Normal breath sounds  No stridor  No wheezing, rhonchi or rales  Abdominal:      General: Abdomen is flat   Bowel sounds are normal  There is no distension  Palpations: Abdomen is soft  There is no mass  Tenderness: There is no abdominal tenderness  There is no right CVA tenderness, left CVA tenderness, guarding or rebound  Negative signs include Jordan's sign, Rovsing's sign, McBurney's sign and obturator sign  Hernia: A hernia is present  Hernia is present in the ventral area and left inguinal area  Genitourinary:     Pubic Area: No rash or pubic lice  Penis: Normal  No phimosis, paraphimosis, hypospadias, erythema, tenderness, discharge or swelling  Testes: Normal          Right: Mass or tenderness not present  Left: Mass or tenderness not present  Ramírez stage (genital): 5  Comments: Left inguinal hernia which is easily reducible  No testicular tenderness, or epididymal tenderness, no masses palpable  Hernia does not extend into the scrotum at this time  Tender stage 5  Musculoskeletal:         General: No swelling, tenderness or deformity  Normal range of motion  Cervical back: Normal range of motion  No rigidity or tenderness  Skin:     General: Skin is warm and dry  Capillary Refill: Capillary refill takes less than 2 seconds  Neurological:      General: No focal deficit present  Mental Status: He is alert and oriented to person, place, and time  Mental status is at baseline  Cranial Nerves: No cranial nerve deficit  Sensory: No sensory deficit  Motor: No weakness           Vital Signs  ED Triage Vitals   Temperature Pulse Respirations Blood Pressure SpO2   10/12/22 1805 10/12/22 1805 10/12/22 1805 10/12/22 1805 10/12/22 1805   98 °F (36 7 °C) 80 18 149/90 99 %      Temp src Heart Rate Source Patient Position - Orthostatic VS BP Location FiO2 (%)   -- 10/12/22 2005 10/12/22 1805 10/12/22 1805 --    Monitor Sitting Right arm       Pain Score       10/12/22 1805       7           Vitals:    10/12/22 1805 10/12/22 2005   BP: 149/90 104/64   Pulse: 80 74   Patient Position - Orthostatic VS: Sitting Sitting         Visual Acuity      ED Medications  Medications   iohexol (OMNIPAQUE) 350 MG/ML injection (SINGLE-DOSE) 100 mL (100 mL Intravenous Given 10/12/22 2128)   ibuprofen (MOTRIN) tablet 600 mg (600 mg Oral Given 10/12/22 2233)       Diagnostic Studies  Results Reviewed     Procedure Component Value Units Date/Time    UA w Reflex to Microscopic w Reflex to Culture [[id number]]  (Abnormal) Collected: 10/12/22 2121    Lab Status: Final result Specimen: Urine, Clean Catch Updated: 10/12/22 2127     Color, UA Yellow     Clarity, UA Slightly Cloudy     Specific [place], UA 1 020     pH, UA 8 0     Leukocytes, UA Negative     Nitrite, UA Negative     Protein, UA Negative mg/dl      Glucose, UA Negative mg/dl      Ketones, UA 15 (1+) mg/dl      Urobilinogen, UA 0 2 E U /dl      Bilirubin, UA Negative     Occult Blood, UA Negative    Comprehensive metabolic panel [[id number]] Collected: 10/12/22 2043    Lab Status: Final result Specimen: Blood from Arm, Right Updated: 10/12/22 2103     Sodium 138 mmol/L      Potassium 3 9 mmol/L      Chloride 104 mmol/L      CO2 28 mmol/L      ANION GAP 6 mmol/L      BUN 7 mg/dL      Creatinine 0 94 mg/dL      Glucose 92 mg/dL      Calcium 9 0 mg/dL      AST 13 U/L      ALT 15 U/L      Alkaline Phosphatase 55 U/L      Total Protein 7 1 g/dL      Albumin 3 6 g/dL      Total Bilirubin 0 66 mg/dL      eGFR 108 ml/min/1 73sq m     Narrative:      [place] guidelines for Chronic Kidney Disease (CKD):   •  Stage 1 with normal or high GFR (GFR > 90 mL/min/1 73 square meters)  •  Stage 2 Mild CKD (GFR = 60-89 mL/min/1 73 square meters)  •  Stage 3A Moderate CKD (GFR = 45-59 mL/min/1 73 square meters)  •  Stage 3B Moderate CKD (GFR = 30-44 mL/min/1 73 square meters)  •  Stage 4 Severe CKD (GFR = 15-29 mL/min/1 73 square meters)  •  Stage 5 End Stage CKD (GFR <15 mL/min/1 73 square meters)  Note: GFR calculation is accurate only with a steady state creatinine    Lipase [[id number]]  (Abnormal) Collected: 10/12/22 2043    Lab Status: Final result Specimen: Blood from Arm, Right Updated: 10/12/22 2103     Lipase 61 u/L     CBC and differential [[id number]] Collected: 10/12/22 2043    Lab Status: Final result Specimen: Blood from Arm, Right Updated: 10/12/22 2052     WBC 7 82 Thousand/uL      RBC 5 10 Million/uL      Hemoglobin 14 6 g/dL      Hematocrit 41 9 %      MCV 82 fL      MCH 28 6 pg      MCHC 34 8 g/dL      RDW 13 2 %      MPV 9 2 fL      Platelets 234 Thousands/uL      nRBC 0 /100 WBCs      Neutrophils Relative 69 %      Immat GRANS % 0 %      Lymphocytes Relative 24 %      Monocytes Relative 6 %      Eosinophils Relative 1 %      Basophils Relative 0 %      Neutrophils Absolute 5 35 Thousands/µL      Immature Grans Absolute 0 03 Thousand/uL      Lymphocytes Absolute 1 87 Thousands/µL      Monocytes Absolute 0 46 Thousand/µL      Eosinophils Absolute 0 09 Thousand/µL      Basophils Absolute 0 02 Thousands/µL                  CT abdomen pelvis with contrast   Final Result by Sheeba Vyas MD (10/12 2201)      No acute inflammatory process identified  No urinary tract calculi  No inguinal hernia  Mild circumferential bladder wall thickening  Correlate for possible cystitis  Workstation performed: [id number]                    Procedures  Procedures         ED Course                               SBIRT 22yo+    Flowsheet Row Most Recent Value   SBIRT (25 yo +)    In order to provide better care to our patients, we are screening all of our patients for alcohol and drug use  Would it be okay to ask you these screening questions?  No Filed at: 10/12/2022 1948                    [facility]  Number of Diagnoses or Management Options  Left flank pain: new and requires workup  Left inguinal hernia: new and requires workup  Diagnosis management comments: Patient was seen and examined  in the emergency department for chief complaint of left lower quadrant abdominal pain radiating to left flank  The patient presented with symptoms ongoing for past months blood pressure has been persistent today  Noticed bulging left inguinal area  No urinary complaints  No vomiting or diarrhea  Having normal bowel movements  No significant tenderness, just pressure  No testicular tenderness  On exam patient is a left inguinal hernia which is easily reducible as well as the ventral hernia which is easily reducible  No skin changes  No tenderness with reduction  No testicular tenderness  No CVA tenderness  Abdomen is soft nontender nondistended  Regular rate rhythm, no murmurs rubs or gallops  Lungs are clear  DDx including but not limited to: appendicitis, gastroenteritis, gastritis, PUD, GERD, gastroparesis, hepatitis, pancreatitis, colitis, enteritis, food poisoning, epiploic appendagitis, mesenteric adenitis, mesenteric panniculitis, IBD, IBS, ileus, bowel obstruction, volvulus, cholecystitis, biliary colic, choledocholithiasis, perforated viscus, tumor, splenic etiology, constipation, diverticulitis, internal hernia, testicular torsion, renal colic, pyelonephritis, UTI  Workup: Will check labs  Will check CT abdomen pelvis with to rule out stone as well as ensure full reduction of hernia  CT does not show any signs of hernia, likely successful reduction by myself  With no tenderness likely reducible hernia that needs follow-up with general surgery  Labs are reassuring  No stone or evidence of urinary tract infection on analysis  Disposition:  General impression 59-year-old male with left flank pain in left inguinal hernia causing pressure  Follow-up General surgery  Return precautions discussed- specifically focal pain/worsening pain, vomiting, not having bowel movements, fevers, chills, or other concerning symptoms  The patient was discharged in stable condition  Patient ambulated off the department  Extensive return to emergency department precautions were discussed  Follow up with appropriate providers including primary care physician was discussed  Patient and/or their  primary decision maker expressed understanding  Patient remained stable during entire emergency department stay  Amount and/or Complexity of Data Reviewed  Clinical lab tests: ordered and reviewed  Tests in the radiology section of CPT®: ordered and reviewed  Tests in the medicine section of CPT®: ordered and reviewed  Review and summarize past medical records: yes  Independent visualization of images, tracings, or specimens: yes    Risk of Complications, Morbidity, and/or Mortality  Presenting problems: moderate  Diagnostic procedures: moderate  Management options: moderate    Patient Progress  Patient progress: stable      Disposition  Final diagnoses:   Left inguinal hernia   Left flank pain     Time reflects when diagnosis was documented in both MDM as applicable and the Disposition within this note     Time User Action Codes Description Comment    10/12/2022 10:17 PM Nguyen Payment Add [K40 90] Left inguinal hernia     10/12/2022 10:17 PM Nguyen Payment Add [R10 9] Left flank pain       ED Disposition     ED Disposition   Discharge    Condition   Stable    Date/Time   Wed Oct 12, 2022 10:17 PM    [address] discharge to home/self care                 Follow-up Information     Follow up With Specialties Details Why Contact Info Additional [address] [facility] Emergency Department Emergency Medicine Go to  As needed, If symptoms worsen [facility] [id number]  112 [facility] Emergency Department, [address] [place] [address] 200  Call  To schedule an appointment with a primary care physician [phone]       Teresa Garrido MD General Surgery, Wound Care Schedule an appointment as soon as possible for a visit Schedule appointment for follow-up of hernia  [address]  241 [facility] 55             Discharge Medication List as of 10/12/2022 10:19 PM      CONTINUE these medications which have NOT CHANGED    Details   naproxen (NAPROSYN) 500 mg tablet Take 1 tablet (500 mg total) by mouth 2 (two) times a day with meals for 5 days, Starting Mon 8/12/2019, Until Sat 8/17/2019, Print      ondansetron (ZOFRAN-ODT) 4 mg disintegrating tablet Take 1 tablet (4 mg total) by mouth every 6 (six) hours as needed for nausea or vomiting, Starting Fri 2/5/2021, Normal      oxyCODONE-acetaminophen (PERCOCET) 5-325 mg per tablet Take 1 tablet by mouth every 8 (eight) hours as needed for severe painMax Daily Amount: 3 tablets, Starting Fri 7/10/2020, Normal             No discharge procedures on file      PDMP Review     None          ED Provider  Electronically Signed by           Jud Light PA-C  10/12/22 0174

## 2022-10-13 NOTE — DISCHARGE INSTRUCTIONS
You were seen in the emergency department today for left flank pain  Laboratory analysis and Radiologic imaging did not reveal any acute abnormalities  Please follow-up with your primary care physician as well as General surgery regarding your symptoms  Return to the Emergency Department sooner if increased pain, fever, vomiting, diarrhea, inability to poop, inability to sleep, difficulty breathing or urinating, bleeding  Close follow-up with General surgery  Return with worsening symptoms